# Patient Record
Sex: FEMALE | Race: OTHER | HISPANIC OR LATINO
[De-identification: names, ages, dates, MRNs, and addresses within clinical notes are randomized per-mention and may not be internally consistent; named-entity substitution may affect disease eponyms.]

---

## 2017-11-02 ENCOUNTER — RECORD ABSTRACTING (OUTPATIENT)
Age: 42
End: 2017-11-02

## 2017-11-02 DIAGNOSIS — Z86.018 PERSONAL HISTORY OF OTHER BENIGN NEOPLASM: ICD-10-CM

## 2017-11-02 DIAGNOSIS — Z92.89 PERSONAL HISTORY OF OTHER MEDICAL TREATMENT: ICD-10-CM

## 2017-11-02 DIAGNOSIS — Z78.9 OTHER SPECIFIED HEALTH STATUS: ICD-10-CM

## 2017-11-02 DIAGNOSIS — Z87.42 PERSONAL HISTORY OF OTHER DISEASES OF THE FEMALE GENITAL TRACT: ICD-10-CM

## 2017-11-02 DIAGNOSIS — Z87.898 PERSONAL HISTORY OF OTHER SPECIFIED CONDITIONS: ICD-10-CM

## 2017-11-06 ENCOUNTER — OUTPATIENT (OUTPATIENT)
Dept: OUTPATIENT SERVICES | Facility: HOSPITAL | Age: 42
LOS: 1 days | End: 2017-11-06
Payer: COMMERCIAL

## 2017-11-06 VITALS
DIASTOLIC BLOOD PRESSURE: 87 MMHG | SYSTOLIC BLOOD PRESSURE: 127 MMHG | RESPIRATION RATE: 16 BRPM | TEMPERATURE: 98 F | HEART RATE: 59 BPM | HEIGHT: 67 IN | WEIGHT: 233.69 LBS

## 2017-11-06 DIAGNOSIS — Z01.818 ENCOUNTER FOR OTHER PREPROCEDURAL EXAMINATION: ICD-10-CM

## 2017-11-06 DIAGNOSIS — Z98.890 OTHER SPECIFIED POSTPROCEDURAL STATES: Chronic | ICD-10-CM

## 2017-11-06 DIAGNOSIS — N92.0 EXCESSIVE AND FREQUENT MENSTRUATION WITH REGULAR CYCLE: ICD-10-CM

## 2017-11-06 DIAGNOSIS — N80.9 ENDOMETRIOSIS, UNSPECIFIED: Chronic | ICD-10-CM

## 2017-11-06 DIAGNOSIS — N80.9 ENDOMETRIOSIS, UNSPECIFIED: ICD-10-CM

## 2017-11-06 LAB
ANION GAP SERPL CALC-SCNC: 12 MMOL/L — SIGNIFICANT CHANGE UP (ref 5–17)
APTT BLD: 33.3 SEC — SIGNIFICANT CHANGE UP (ref 27.5–37.4)
BLD GP AB SCN SERPL QL: SIGNIFICANT CHANGE UP
BUN SERPL-MCNC: 7 MG/DL — LOW (ref 8–20)
CALCIUM SERPL-MCNC: 8.5 MG/DL — LOW (ref 8.6–10.2)
CHLORIDE SERPL-SCNC: 102 MMOL/L — SIGNIFICANT CHANGE UP (ref 98–107)
CO2 SERPL-SCNC: 26 MMOL/L — SIGNIFICANT CHANGE UP (ref 22–29)
CREAT SERPL-MCNC: 0.56 MG/DL — SIGNIFICANT CHANGE UP (ref 0.5–1.3)
GLUCOSE SERPL-MCNC: 95 MG/DL — SIGNIFICANT CHANGE UP (ref 70–115)
HCG UR QL: NEGATIVE — SIGNIFICANT CHANGE UP
HCT VFR BLD CALC: 42 % — SIGNIFICANT CHANGE UP (ref 37–47)
HGB BLD-MCNC: 13.9 G/DL — SIGNIFICANT CHANGE UP (ref 12–16)
INR BLD: 0.91 RATIO — SIGNIFICANT CHANGE UP (ref 0.88–1.16)
MCHC RBC-ENTMCNC: 28.8 PG — SIGNIFICANT CHANGE UP (ref 27–31)
MCHC RBC-ENTMCNC: 33.1 G/DL — SIGNIFICANT CHANGE UP (ref 32–36)
MCV RBC AUTO: 87 FL — SIGNIFICANT CHANGE UP (ref 81–99)
PLATELET # BLD AUTO: 284 K/UL — SIGNIFICANT CHANGE UP (ref 150–400)
POTASSIUM SERPL-MCNC: 4.1 MMOL/L — SIGNIFICANT CHANGE UP (ref 3.5–5.3)
POTASSIUM SERPL-SCNC: 4.1 MMOL/L — SIGNIFICANT CHANGE UP (ref 3.5–5.3)
PROTHROM AB SERPL-ACNC: 10 SEC — SIGNIFICANT CHANGE UP (ref 9.8–12.7)
RBC # BLD: 4.83 M/UL — SIGNIFICANT CHANGE UP (ref 4.4–5.2)
RBC # FLD: 12.9 % — SIGNIFICANT CHANGE UP (ref 11–15.6)
SODIUM SERPL-SCNC: 140 MMOL/L — SIGNIFICANT CHANGE UP (ref 135–145)
TYPE + AB SCN PNL BLD: SIGNIFICANT CHANGE UP
WBC # BLD: 6.2 K/UL — SIGNIFICANT CHANGE UP (ref 4.8–10.8)
WBC # FLD AUTO: 6.2 K/UL — SIGNIFICANT CHANGE UP (ref 4.8–10.8)

## 2017-11-06 PROCEDURE — G0463: CPT

## 2017-11-06 PROCEDURE — 85610 PROTHROMBIN TIME: CPT

## 2017-11-06 PROCEDURE — 86900 BLOOD TYPING SEROLOGIC ABO: CPT

## 2017-11-06 PROCEDURE — 85730 THROMBOPLASTIN TIME PARTIAL: CPT

## 2017-11-06 PROCEDURE — 86901 BLOOD TYPING SEROLOGIC RH(D): CPT

## 2017-11-06 PROCEDURE — 85027 COMPLETE CBC AUTOMATED: CPT

## 2017-11-06 PROCEDURE — 86850 RBC ANTIBODY SCREEN: CPT

## 2017-11-06 PROCEDURE — 80048 BASIC METABOLIC PNL TOTAL CA: CPT

## 2017-11-06 PROCEDURE — 81025 URINE PREGNANCY TEST: CPT

## 2017-11-06 PROCEDURE — 36415 COLL VENOUS BLD VENIPUNCTURE: CPT

## 2017-11-06 NOTE — H&P PST ADULT - FAMILY HISTORY
Mother  Still living? Yes, Estimated age: 51-60  Family history of breast cancer, Age at diagnosis: 41-50

## 2017-11-06 NOTE — H&P PST ADULT - NSANTHOSAYNRD_GEN_A_CORE
No. CHRISTINE screening performed.  STOP BANG Legend: 0-2 = LOW Risk; 3-4 = INTERMEDIATE Risk; 5-8 = HIGH Risk/Tested negative four years ago

## 2017-11-14 ENCOUNTER — RESULT REVIEW (OUTPATIENT)
Age: 42
End: 2017-11-14

## 2017-11-14 ENCOUNTER — APPOINTMENT (OUTPATIENT)
Dept: OBGYN | Facility: HOSPITAL | Age: 42
End: 2017-11-14

## 2017-11-14 ENCOUNTER — OUTPATIENT (OUTPATIENT)
Dept: OUTPATIENT SERVICES | Facility: HOSPITAL | Age: 42
LOS: 1 days | End: 2017-11-14
Payer: COMMERCIAL

## 2017-11-14 ENCOUNTER — TRANSCRIPTION ENCOUNTER (OUTPATIENT)
Age: 42
End: 2017-11-14

## 2017-11-14 VITALS
WEIGHT: 233.03 LBS | TEMPERATURE: 98 F | HEART RATE: 77 BPM | SYSTOLIC BLOOD PRESSURE: 110 MMHG | HEIGHT: 67 IN | DIASTOLIC BLOOD PRESSURE: 50 MMHG | OXYGEN SATURATION: 99 % | RESPIRATION RATE: 16 BRPM

## 2017-11-14 VITALS
HEART RATE: 66 BPM | DIASTOLIC BLOOD PRESSURE: 71 MMHG | TEMPERATURE: 98 F | SYSTOLIC BLOOD PRESSURE: 111 MMHG | RESPIRATION RATE: 16 BRPM | OXYGEN SATURATION: 97 %

## 2017-11-14 DIAGNOSIS — R10.2 PELVIC AND PERINEAL PAIN: ICD-10-CM

## 2017-11-14 DIAGNOSIS — Z01.818 ENCOUNTER FOR OTHER PREPROCEDURAL EXAMINATION: ICD-10-CM

## 2017-11-14 DIAGNOSIS — N80.9 ENDOMETRIOSIS, UNSPECIFIED: Chronic | ICD-10-CM

## 2017-11-14 DIAGNOSIS — N92.0 EXCESSIVE AND FREQUENT MENSTRUATION WITH REGULAR CYCLE: ICD-10-CM

## 2017-11-14 DIAGNOSIS — Z98.890 OTHER SPECIFIED POSTPROCEDURAL STATES: Chronic | ICD-10-CM

## 2017-11-14 LAB — ABO RH CONFIRMATION: SIGNIFICANT CHANGE UP

## 2017-11-14 PROCEDURE — 88305 TISSUE EXAM BY PATHOLOGIST: CPT | Mod: 26

## 2017-11-14 PROCEDURE — 86850 RBC ANTIBODY SCREEN: CPT

## 2017-11-14 PROCEDURE — 88305 TISSUE EXAM BY PATHOLOGIST: CPT

## 2017-11-14 PROCEDURE — 58558 HYSTEROSCOPY BIOPSY: CPT

## 2017-11-14 PROCEDURE — 36415 COLL VENOUS BLD VENIPUNCTURE: CPT

## 2017-11-14 PROCEDURE — 86900 BLOOD TYPING SEROLOGIC ABO: CPT

## 2017-11-14 RX ORDER — SODIUM CHLORIDE 9 MG/ML
1000 INJECTION, SOLUTION INTRAVENOUS
Qty: 0 | Refills: 0 | Status: DISCONTINUED | OUTPATIENT
Start: 2017-11-14 | End: 2017-11-14

## 2017-11-14 RX ORDER — SODIUM CHLORIDE 9 MG/ML
3 INJECTION INTRAMUSCULAR; INTRAVENOUS; SUBCUTANEOUS ONCE
Qty: 0 | Refills: 0 | Status: DISCONTINUED | OUTPATIENT
Start: 2017-11-14 | End: 2017-11-14

## 2017-11-14 RX ORDER — ONDANSETRON 8 MG/1
4 TABLET, FILM COATED ORAL ONCE
Qty: 0 | Refills: 0 | Status: DISCONTINUED | OUTPATIENT
Start: 2017-11-14 | End: 2017-11-14

## 2017-11-14 RX ORDER — FENTANYL CITRATE 50 UG/ML
25 INJECTION INTRAVENOUS
Qty: 0 | Refills: 0 | Status: DISCONTINUED | OUTPATIENT
Start: 2017-11-14 | End: 2017-11-14

## 2017-11-14 RX ORDER — OXYCODONE AND ACETAMINOPHEN 5; 325 MG/1; MG/1
1 TABLET ORAL EVERY 4 HOURS
Qty: 0 | Refills: 0 | Status: DISCONTINUED | OUTPATIENT
Start: 2017-11-14 | End: 2017-11-14

## 2017-11-14 RX ADMIN — SODIUM CHLORIDE 125 MILLILITER(S): 9 INJECTION, SOLUTION INTRAVENOUS at 19:20

## 2017-11-14 RX ADMIN — FENTANYL CITRATE 25 MICROGRAM(S): 50 INJECTION INTRAVENOUS at 20:10

## 2017-11-14 RX ADMIN — FENTANYL CITRATE 25 MICROGRAM(S): 50 INJECTION INTRAVENOUS at 19:40

## 2017-11-14 RX ADMIN — FENTANYL CITRATE 25 MICROGRAM(S): 50 INJECTION INTRAVENOUS at 19:19

## 2017-11-14 RX ADMIN — OXYCODONE AND ACETAMINOPHEN 1 TABLET(S): 5; 325 TABLET ORAL at 21:00

## 2017-11-14 RX ADMIN — FENTANYL CITRATE 25 MICROGRAM(S): 50 INJECTION INTRAVENOUS at 20:09

## 2017-11-14 RX ADMIN — OXYCODONE AND ACETAMINOPHEN 1 TABLET(S): 5; 325 TABLET ORAL at 20:29

## 2017-11-14 NOTE — ASU DISCHARGE PLAN (ADULT/PEDIATRIC). - MEDICATION SUMMARY - MEDICATIONS TO TAKE
I will START or STAY ON the medications listed below when I get home from the hospital:    Valerian oral capsule  -- Indication: For Menorrhagia with regular cycle

## 2017-11-14 NOTE — BRIEF OPERATIVE NOTE - PROCEDURE
<<-----Click on this checkbox to enter Procedure Hysteroscopy with dilation and curettage of uterus  11/14/2017    Active  SHERRILL

## 2017-11-14 NOTE — ASU DISCHARGE PLAN (ADULT/PEDIATRIC). - ACTIVITY LEVEL
no weight bearing/no heavy lifting/quiet play/no tub baths/no douching/no intercourse/no exercise/no sports/gym/weight bearing as tolerated/nothing per vagina/elevate extremity/nothing per rectum/no tampons

## 2017-11-14 NOTE — ASU DISCHARGE PLAN (ADULT/PEDIATRIC). - NOTIFY
Bleeding that does not stop Inability to Tolerate Liquids or Foods/GYN Fever>100.4/Persistent Nausea and Vomiting/Bleeding that does not stop

## 2017-11-16 LAB — SURGICAL PATHOLOGY FINAL REPORT - CH: SIGNIFICANT CHANGE UP

## 2017-11-23 ENCOUNTER — APPOINTMENT (OUTPATIENT)
Dept: OBGYN | Facility: CLINIC | Age: 42
End: 2017-11-23

## 2017-11-29 ENCOUNTER — APPOINTMENT (OUTPATIENT)
Dept: OBGYN | Facility: CLINIC | Age: 42
End: 2017-11-29
Payer: COMMERCIAL

## 2017-11-29 PROCEDURE — 99213 OFFICE O/P EST LOW 20 MIN: CPT

## 2018-01-10 ENCOUNTER — TRANSCRIPTION ENCOUNTER (OUTPATIENT)
Age: 43
End: 2018-01-10

## 2018-06-01 ENCOUNTER — OTHER (OUTPATIENT)
Age: 43
End: 2018-06-01

## 2018-06-01 ENCOUNTER — RX RENEWAL (OUTPATIENT)
Age: 43
End: 2018-06-01

## 2018-06-25 ENCOUNTER — APPOINTMENT (OUTPATIENT)
Dept: OBGYN | Facility: CLINIC | Age: 43
End: 2018-06-25
Payer: COMMERCIAL

## 2018-06-25 DIAGNOSIS — Z80.9 FAMILY HISTORY OF MALIGNANT NEOPLASM, UNSPECIFIED: ICD-10-CM

## 2018-06-25 DIAGNOSIS — Z00.00 ENCOUNTER FOR GENERAL ADULT MEDICAL EXAMINATION W/OUT ABNORMAL FINDINGS: ICD-10-CM

## 2018-06-25 PROCEDURE — 99214 OFFICE O/P EST MOD 30 MIN: CPT

## 2018-06-27 LAB
C TRACH RRNA SPEC QL NAA+PROBE: NOT DETECTED
HPV HIGH+LOW RISK DNA PNL CVX: NOT DETECTED
N GONORRHOEA RRNA SPEC QL NAA+PROBE: NOT DETECTED
SOURCE TP AMPLIFICATION: NORMAL

## 2018-06-28 LAB — CYTOLOGY CVX/VAG DOC THIN PREP: NORMAL

## 2018-07-11 ENCOUNTER — APPOINTMENT (OUTPATIENT)
Dept: OBGYN | Facility: CLINIC | Age: 43
End: 2018-07-11
Payer: COMMERCIAL

## 2018-07-11 VITALS
HEIGHT: 67 IN | DIASTOLIC BLOOD PRESSURE: 69 MMHG | SYSTOLIC BLOOD PRESSURE: 131 MMHG | BODY MASS INDEX: 37.67 KG/M2 | HEART RATE: 74 BPM | WEIGHT: 240 LBS

## 2018-07-11 PROCEDURE — 99213 OFFICE O/P EST LOW 20 MIN: CPT

## 2018-07-16 PROBLEM — N80.9 ENDOMETRIOSIS, UNSPECIFIED: Chronic | Status: ACTIVE | Noted: 2017-11-06

## 2018-07-16 PROBLEM — D64.9 ANEMIA, UNSPECIFIED: Chronic | Status: ACTIVE | Noted: 2017-11-06

## 2018-08-21 ENCOUNTER — OUTPATIENT (OUTPATIENT)
Dept: OUTPATIENT SERVICES | Facility: HOSPITAL | Age: 43
LOS: 1 days | End: 2018-08-21
Payer: COMMERCIAL

## 2018-08-21 VITALS
TEMPERATURE: 98 F | HEART RATE: 64 BPM | RESPIRATION RATE: 15 BRPM | WEIGHT: 229.28 LBS | DIASTOLIC BLOOD PRESSURE: 64 MMHG | HEIGHT: 67 IN | SYSTOLIC BLOOD PRESSURE: 100 MMHG

## 2018-08-21 DIAGNOSIS — Z98.890 OTHER SPECIFIED POSTPROCEDURAL STATES: Chronic | ICD-10-CM

## 2018-08-21 DIAGNOSIS — D25.9 LEIOMYOMA OF UTERUS, UNSPECIFIED: ICD-10-CM

## 2018-08-21 DIAGNOSIS — N80.9 ENDOMETRIOSIS, UNSPECIFIED: Chronic | ICD-10-CM

## 2018-08-21 DIAGNOSIS — Z01.818 ENCOUNTER FOR OTHER PREPROCEDURAL EXAMINATION: ICD-10-CM

## 2018-08-21 LAB
ANION GAP SERPL CALC-SCNC: 13 MMOL/L — SIGNIFICANT CHANGE UP (ref 5–17)
BUN SERPL-MCNC: 12 MG/DL — SIGNIFICANT CHANGE UP (ref 8–20)
CALCIUM SERPL-MCNC: 8.6 MG/DL — SIGNIFICANT CHANGE UP (ref 8.6–10.2)
CHLORIDE SERPL-SCNC: 103 MMOL/L — SIGNIFICANT CHANGE UP (ref 98–107)
CO2 SERPL-SCNC: 23 MMOL/L — SIGNIFICANT CHANGE UP (ref 22–29)
CREAT SERPL-MCNC: 0.61 MG/DL — SIGNIFICANT CHANGE UP (ref 0.5–1.3)
GLUCOSE SERPL-MCNC: 92 MG/DL — SIGNIFICANT CHANGE UP (ref 70–115)
HCT VFR BLD CALC: 40.3 % — SIGNIFICANT CHANGE UP (ref 37–47)
HGB BLD-MCNC: 13.5 G/DL — SIGNIFICANT CHANGE UP (ref 12–16)
MCHC RBC-ENTMCNC: 29 PG — SIGNIFICANT CHANGE UP (ref 27–31)
MCHC RBC-ENTMCNC: 33.5 G/DL — SIGNIFICANT CHANGE UP (ref 32–36)
MCV RBC AUTO: 86.7 FL — SIGNIFICANT CHANGE UP (ref 81–99)
PLATELET # BLD AUTO: 293 K/UL — SIGNIFICANT CHANGE UP (ref 150–400)
POTASSIUM SERPL-MCNC: 3.8 MMOL/L — SIGNIFICANT CHANGE UP (ref 3.5–5.3)
POTASSIUM SERPL-SCNC: 3.8 MMOL/L — SIGNIFICANT CHANGE UP (ref 3.5–5.3)
RBC # BLD: 4.65 M/UL — SIGNIFICANT CHANGE UP (ref 4.4–5.2)
RBC # FLD: 13.7 % — SIGNIFICANT CHANGE UP (ref 11–15.6)
SODIUM SERPL-SCNC: 139 MMOL/L — SIGNIFICANT CHANGE UP (ref 135–145)
TYPE + AB SCN PNL BLD: SIGNIFICANT CHANGE UP
WBC # BLD: 6.6 K/UL — SIGNIFICANT CHANGE UP (ref 4.8–10.8)
WBC # FLD AUTO: 6.6 K/UL — SIGNIFICANT CHANGE UP (ref 4.8–10.8)

## 2018-08-21 PROCEDURE — 86850 RBC ANTIBODY SCREEN: CPT

## 2018-08-21 PROCEDURE — 80048 BASIC METABOLIC PNL TOTAL CA: CPT

## 2018-08-21 PROCEDURE — G0463: CPT

## 2018-08-21 PROCEDURE — 86901 BLOOD TYPING SEROLOGIC RH(D): CPT

## 2018-08-21 PROCEDURE — 86900 BLOOD TYPING SEROLOGIC ABO: CPT

## 2018-08-21 PROCEDURE — 36415 COLL VENOUS BLD VENIPUNCTURE: CPT

## 2018-08-21 PROCEDURE — 85027 COMPLETE CBC AUTOMATED: CPT

## 2018-08-21 RX ORDER — SODIUM CHLORIDE 9 MG/ML
3 INJECTION INTRAMUSCULAR; INTRAVENOUS; SUBCUTANEOUS EVERY 8 HOURS
Qty: 0 | Refills: 0 | Status: DISCONTINUED | OUTPATIENT
Start: 2018-08-28 | End: 2018-08-30

## 2018-08-21 NOTE — H&P PST ADULT - ASSESSMENT
43 year old female presents with uterine fibroids and menorrhagia scheduled for multiple myomectomies and an exploratory laparotomy on 18.  CAPRINI SCORE [CLOT]    AGE RELATED RISK FACTORS                                                       MOBILITY RELATED FACTORS  [ ] Age 41-60 years                                            (1 Point)                  [ ] Bed rest                                                        (1 Point)  [ ] Age: 61-74 years                                           (2 Points)                 [ ] Plaster cast                                                   (2 Points)  [ ] Age= 75 years                                              (3 Points)                 [ ] Bed bound for more than 72 hours                 (2 Points)    DISEASE RELATED RISK FACTORS                                               GENDER SPECIFIC FACTORS  [ ] Edema in the lower extremities                       (1 Point)                  [ ] Pregnancy                                                     (1 Point)  [ ] Varicose veins                                               (1 Point)                  [ ] Post-partum < 6 weeks                                   (1 Point)             [x ] BMI > 25 Kg/m2                                            (1 Point)                  [x ] Hormonal therapy  or oral contraception          (1 Point)                 [ ] Sepsis (in the previous month)                        (1 Point)                  [ ] History of pregnancy complications                 (1 point)  [ ] Pneumonia or serious lung disease                                               [ ] Unexplained or recurrent                     (1 Point)           (in the previous month)                               (1 Point)  [ ] Abnormal pulmonary function test                     (1 Point)                 SURGERY RELATED RISK FACTORS  [ ] Acute myocardial infarction                              (1 Point)                 [ ]  Section                                             (1 Point)  [ ] Congestive heart failure (in the previous month)  (1 Point)               [ ] Minor surgery                                                  (1 Point)   [ ] Inflammatory bowel disease                             (1 Point)                 [ ] Arthroscopic surgery                                        (2 Points)  [ ] Central venous access                                      (2 Points)                [x ] General surgery lasting more than 45 minutes   (2 Points)       [ ] Stroke (in the previous month)                          (5 Points)               [ ] Elective arthroplasty                                         (5 Points)                                                                                                                                               HEMATOLOGY RELATED FACTORS                                                 TRAUMA RELATED RISK FACTORS  [ ] Prior episodes of VTE                                     (3 Points)                 [ ] Fracture of the hip, pelvis, or leg                       (5 Points)  [ ] Positive family history for VTE                         (3 Points)                 [ ] Acute spinal cord injury (in the previous month)  (5 Points)  [ ] Prothrombin 94585 A                                     (3 Points)                 [ ] Paralysis  (less than 1 month)                             (5 Points)  [ ] Factor V Leiden                                             (3 Points)                  [ ] Multiple Trauma within 1 month                        (5 Points)  [ ] Lupus anticoagulants                                     (3 Points)                                                           [ ] Anticardiolipin antibodies                               (3 Points)                                                       [ ] High homocysteine in the blood                      (3 Points)                                             [ ] Other congenital or acquired thrombophilia      (3 Points)                                                [ ] Heparin induced thrombocytopenia                  (3 Points)                                          Total Score [   5       ]

## 2018-08-21 NOTE — H&P PST ADULT - HISTORY OF PRESENT ILLNESS
43 year old female with a history of PCOS and endometriosis presents with uterine fibroids scheduled for a myomectomy and an exploratory laparotomy on 8/28/18.

## 2018-08-27 ENCOUNTER — TRANSCRIPTION ENCOUNTER (OUTPATIENT)
Age: 43
End: 2018-08-27

## 2018-08-28 ENCOUNTER — TRANSCRIPTION ENCOUNTER (OUTPATIENT)
Age: 43
End: 2018-08-28

## 2018-08-28 ENCOUNTER — APPOINTMENT (OUTPATIENT)
Dept: OBGYN | Facility: HOSPITAL | Age: 43
End: 2018-08-28

## 2018-08-28 ENCOUNTER — RESULT REVIEW (OUTPATIENT)
Age: 43
End: 2018-08-28

## 2018-08-28 ENCOUNTER — INPATIENT (INPATIENT)
Facility: HOSPITAL | Age: 43
LOS: 1 days | Discharge: HOME | DRG: 743 | End: 2018-08-30
Attending: OBSTETRICS & GYNECOLOGY | Admitting: OBSTETRICS & GYNECOLOGY
Payer: COMMERCIAL

## 2018-08-28 VITALS
WEIGHT: 229.94 LBS | HEIGHT: 67 IN | OXYGEN SATURATION: 100 % | RESPIRATION RATE: 16 BRPM | DIASTOLIC BLOOD PRESSURE: 65 MMHG | TEMPERATURE: 98 F | SYSTOLIC BLOOD PRESSURE: 142 MMHG | HEART RATE: 65 BPM

## 2018-08-28 DIAGNOSIS — Z98.890 OTHER SPECIFIED POSTPROCEDURAL STATES: Chronic | ICD-10-CM

## 2018-08-28 DIAGNOSIS — D25.1 INTRAMURAL LEIOMYOMA OF UTERUS: ICD-10-CM

## 2018-08-28 DIAGNOSIS — N80.9 ENDOMETRIOSIS, UNSPECIFIED: Chronic | ICD-10-CM

## 2018-08-28 LAB
GLUCOSE BLDC GLUCOMTR-MCNC: 109 MG/DL — HIGH (ref 70–99)
GLUCOSE BLDC GLUCOMTR-MCNC: 95 MG/DL — SIGNIFICANT CHANGE UP (ref 70–99)

## 2018-08-28 PROCEDURE — 58140 MYOMECTOMY ABDOM METHOD: CPT

## 2018-08-28 PROCEDURE — 88307 TISSUE EXAM BY PATHOLOGIST: CPT | Mod: 26

## 2018-08-28 RX ORDER — ONDANSETRON 8 MG/1
4 TABLET, FILM COATED ORAL ONCE
Qty: 0 | Refills: 0 | Status: DISCONTINUED | OUTPATIENT
Start: 2018-08-28 | End: 2018-08-28

## 2018-08-28 RX ORDER — ONDANSETRON 8 MG/1
4 TABLET, FILM COATED ORAL EVERY 6 HOURS
Qty: 0 | Refills: 0 | Status: DISCONTINUED | OUTPATIENT
Start: 2018-08-28 | End: 2018-08-30

## 2018-08-28 RX ORDER — OXYCODONE AND ACETAMINOPHEN 5; 325 MG/1; MG/1
1 TABLET ORAL EVERY 4 HOURS
Qty: 0 | Refills: 0 | Status: DISCONTINUED | OUTPATIENT
Start: 2018-08-28 | End: 2018-08-30

## 2018-08-28 RX ORDER — HYDROMORPHONE HYDROCHLORIDE 2 MG/ML
30 INJECTION INTRAMUSCULAR; INTRAVENOUS; SUBCUTANEOUS
Qty: 0 | Refills: 0 | Status: DISCONTINUED | OUTPATIENT
Start: 2018-08-28 | End: 2018-08-28

## 2018-08-28 RX ORDER — OXYCODONE AND ACETAMINOPHEN 5; 325 MG/1; MG/1
2 TABLET ORAL EVERY 6 HOURS
Qty: 0 | Refills: 0 | Status: DISCONTINUED | OUTPATIENT
Start: 2018-08-28 | End: 2018-08-29

## 2018-08-28 RX ORDER — CEFOTETAN DISODIUM 1 G
2 VIAL (EA) INJECTION ONCE
Qty: 0 | Refills: 0 | Status: COMPLETED | OUTPATIENT
Start: 2018-08-28 | End: 2018-08-28

## 2018-08-28 RX ORDER — DOCUSATE SODIUM 100 MG
100 CAPSULE ORAL THREE TIMES A DAY
Qty: 0 | Refills: 0 | Status: DISCONTINUED | OUTPATIENT
Start: 2018-08-28 | End: 2018-08-30

## 2018-08-28 RX ORDER — SODIUM CHLORIDE 9 MG/ML
1000 INJECTION, SOLUTION INTRAVENOUS
Qty: 0 | Refills: 0 | Status: DISCONTINUED | OUTPATIENT
Start: 2018-08-28 | End: 2018-08-30

## 2018-08-28 RX ORDER — ONDANSETRON 8 MG/1
4 TABLET, FILM COATED ORAL ONCE
Qty: 0 | Refills: 0 | Status: DISCONTINUED | OUTPATIENT
Start: 2018-08-28 | End: 2018-08-30

## 2018-08-28 RX ORDER — NALOXONE HYDROCHLORIDE 4 MG/.1ML
0.1 SPRAY NASAL
Qty: 0 | Refills: 0 | Status: DISCONTINUED | OUTPATIENT
Start: 2018-08-28 | End: 2018-08-30

## 2018-08-28 RX ORDER — IBUPROFEN 200 MG
400 TABLET ORAL EVERY 6 HOURS
Qty: 0 | Refills: 0 | Status: DISCONTINUED | OUTPATIENT
Start: 2018-08-28 | End: 2018-08-30

## 2018-08-28 RX ORDER — METFORMIN HYDROCHLORIDE 850 MG/1
500 TABLET ORAL
Qty: 0 | Refills: 0 | Status: DISCONTINUED | OUTPATIENT
Start: 2018-08-28 | End: 2018-08-30

## 2018-08-28 RX ORDER — DIPHENHYDRAMINE HCL 50 MG
12.5 CAPSULE ORAL EVERY 4 HOURS
Qty: 0 | Refills: 0 | Status: DISCONTINUED | OUTPATIENT
Start: 2018-08-28 | End: 2018-08-30

## 2018-08-28 RX ORDER — METFORMIN HYDROCHLORIDE 850 MG/1
1 TABLET ORAL
Qty: 0 | Refills: 0 | COMMUNITY

## 2018-08-28 RX ORDER — SODIUM CHLORIDE 9 MG/ML
1000 INJECTION, SOLUTION INTRAVENOUS
Qty: 0 | Refills: 0 | Status: DISCONTINUED | OUTPATIENT
Start: 2018-08-28 | End: 2018-08-28

## 2018-08-28 RX ORDER — HYDROMORPHONE HYDROCHLORIDE 2 MG/ML
0.5 INJECTION INTRAMUSCULAR; INTRAVENOUS; SUBCUTANEOUS
Qty: 0 | Refills: 0 | Status: DISCONTINUED | OUTPATIENT
Start: 2018-08-28 | End: 2018-08-28

## 2018-08-28 RX ADMIN — OXYCODONE AND ACETAMINOPHEN 1 TABLET(S): 5; 325 TABLET ORAL at 20:26

## 2018-08-28 RX ADMIN — HYDROMORPHONE HYDROCHLORIDE 30 MILLILITER(S): 2 INJECTION INTRAMUSCULAR; INTRAVENOUS; SUBCUTANEOUS at 13:06

## 2018-08-28 RX ADMIN — HYDROMORPHONE HYDROCHLORIDE 30 MILLILITER(S): 2 INJECTION INTRAMUSCULAR; INTRAVENOUS; SUBCUTANEOUS at 15:28

## 2018-08-28 RX ADMIN — Medication 100 GRAM(S): at 11:50

## 2018-08-28 RX ADMIN — OXYCODONE AND ACETAMINOPHEN 2 TABLET(S): 5; 325 TABLET ORAL at 21:00

## 2018-08-28 RX ADMIN — SODIUM CHLORIDE 3 MILLILITER(S): 9 INJECTION INTRAMUSCULAR; INTRAVENOUS; SUBCUTANEOUS at 13:41

## 2018-08-28 RX ADMIN — OXYCODONE AND ACETAMINOPHEN 1 TABLET(S): 5; 325 TABLET ORAL at 19:38

## 2018-08-28 RX ADMIN — SODIUM CHLORIDE 3 MILLILITER(S): 9 INJECTION INTRAMUSCULAR; INTRAVENOUS; SUBCUTANEOUS at 23:43

## 2018-08-28 RX ADMIN — ONDANSETRON 4 MILLIGRAM(S): 8 TABLET, FILM COATED ORAL at 15:54

## 2018-08-28 RX ADMIN — OXYCODONE AND ACETAMINOPHEN 1 TABLET(S): 5; 325 TABLET ORAL at 19:41

## 2018-08-28 NOTE — DISCHARGE NOTE ADULT - PLAN OF CARE
rapid recovery Patient should transition to regular activity level. Resume regular diet. Patient should follow up with her provider for a checkup 1 week after discharge from the hospital. Patient should call her doctor sooner if she develops a fever or uncontrolled vaginal bleeding. Nothing per vagina for 6 weeks (incl. sex, douching, etc). If you have additional concerns, please inform your provider.

## 2018-08-28 NOTE — DISCHARGE NOTE ADULT - MEDICATION SUMMARY - MEDICATIONS TO TAKE
I will START or STAY ON the medications listed below when I get home from the hospital:    Percocet 5/325 oral tablet  -- 1 tab(s) by mouth every 6 hours x 5 days, As Needed -Moderate Pain (4 - 6) MDD:4 tabs  -- Indication: For Severe Pain    ibuprofen 400 mg oral tablet  -- 1 tab(s) by mouth every 6 hours, As needed, Moderate pain  -- Indication: For Mild Pain    metFORMIN 500 mg oral tablet  -- 1 tab(s) by mouth 2 times a day  -- Indication: For Home med

## 2018-08-28 NOTE — DISCHARGE NOTE ADULT - CARE PROVIDERS DIRECT ADDRESSES
[email or web address]

## 2018-08-28 NOTE — DISCHARGE NOTE ADULT - MEDICATION SUMMARY - MEDICATIONS TO STOP TAKING
I will STOP taking the medications listed below when I get home from the hospital:    Microgestin 1.5/30 oral tablet  -- 1 tab(s) by mouth 2 times a day

## 2018-08-28 NOTE — DISCHARGE NOTE ADULT - HOSPITAL COURSE
Pt was admitted from [facility] for abdominal myomectomy. After unremarkable extubation, pt was transferred to PACU and then the floor in stable condition. Hospital course unremarkable. At discharge, pt is stable, urinating, passing flatus, ambulating, and tolerating PO intake.

## 2018-08-28 NOTE — DISCHARGE NOTE ADULT - CARE PROVIDER_API CALL
Raymundo House), Obstetrics and Gynecology  [address] [place] [address]  Phone: [phone]  Fax: [phone]

## 2018-08-28 NOTE — BRIEF OPERATIVE NOTE - POST-OP DX
Adenomyosis internal  08/28/2018    Active  Raymundo House  Intramural and submucous leiomyoma of uterus  08/28/2018    Active  Raymundo House

## 2018-08-28 NOTE — DISCHARGE NOTE ADULT - CARE PLAN
Principal Discharge DX:	H/O myomectomy  Goal:	rapid recovery  Assessment and plan of treatment:	Patient should transition to regular activity level. Resume regular diet. Patient should follow up with her provider for a checkup 1 week after discharge from the hospital. Patient should call her doctor sooner if she develops a fever or uncontrolled vaginal bleeding. Nothing per vagina for 6 weeks (incl. sex, douching, etc). If you have additional concerns, please inform your provider.

## 2018-08-29 DIAGNOSIS — Z98.890 OTHER SPECIFIED POSTPROCEDURAL STATES: ICD-10-CM

## 2018-08-29 LAB
EOSINOPHIL # BLD AUTO: 0 K/UL — SIGNIFICANT CHANGE UP (ref 0–0.5)
EOSINOPHIL NFR BLD AUTO: 0 % — SIGNIFICANT CHANGE UP (ref 0–6)
HCT VFR BLD CALC: 39.5 % — SIGNIFICANT CHANGE UP (ref 37–47)
HGB BLD-MCNC: 13 G/DL — SIGNIFICANT CHANGE UP (ref 12–16)
LYMPHOCYTES # BLD AUTO: 0.5 K/UL — LOW (ref 1–4.8)
LYMPHOCYTES # BLD AUTO: 3.6 % — LOW (ref 20–55)
MCHC RBC-ENTMCNC: 28.6 PG — SIGNIFICANT CHANGE UP (ref 27–31)
MCHC RBC-ENTMCNC: 32.9 G/DL — SIGNIFICANT CHANGE UP (ref 32–36)
MCV RBC AUTO: 87 FL — SIGNIFICANT CHANGE UP (ref 81–99)
MONOCYTES # BLD AUTO: 0.9 K/UL — HIGH (ref 0–0.8)
MONOCYTES NFR BLD AUTO: 6.2 % — SIGNIFICANT CHANGE UP (ref 3–10)
NEUTROPHILS # BLD AUTO: 13.3 K/UL — HIGH (ref 1.8–8)
NEUTROPHILS NFR BLD AUTO: 89.9 % — HIGH (ref 37–73)
PLATELET # BLD AUTO: 304 K/UL — SIGNIFICANT CHANGE UP (ref 150–400)
RBC # BLD: 4.54 M/UL — SIGNIFICANT CHANGE UP (ref 4.4–5.2)
RBC # FLD: 13.9 % — SIGNIFICANT CHANGE UP (ref 11–15.6)
WBC # BLD: 14.8 K/UL — HIGH (ref 4.8–10.8)
WBC # FLD AUTO: 14.8 K/UL — HIGH (ref 4.8–10.8)

## 2018-08-29 RX ORDER — IBUPROFEN 200 MG
1 TABLET ORAL
Qty: 28 | Refills: 0 | OUTPATIENT
Start: 2018-08-29 | End: 2018-09-04

## 2018-08-29 RX ORDER — OXYCODONE AND ACETAMINOPHEN 5; 325 MG/1; MG/1
2 TABLET ORAL
Qty: 0 | Refills: 0 | Status: DISCONTINUED | OUTPATIENT
Start: 2018-08-29 | End: 2018-08-30

## 2018-08-29 RX ORDER — NORETHINDRONE AND ETHINYL ESTRADIOL 0.4-0.035
1 KIT ORAL
Qty: 0 | Refills: 0 | COMMUNITY

## 2018-08-29 RX ORDER — OXYCODONE AND ACETAMINOPHEN 5; 325 MG/1; MG/1
2 TABLET ORAL ONCE
Qty: 0 | Refills: 0 | Status: DISCONTINUED | OUTPATIENT
Start: 2018-08-29 | End: 2018-08-29

## 2018-08-29 RX ADMIN — OXYCODONE AND ACETAMINOPHEN 2 TABLET(S): 5; 325 TABLET ORAL at 21:31

## 2018-08-29 RX ADMIN — OXYCODONE AND ACETAMINOPHEN 2 TABLET(S): 5; 325 TABLET ORAL at 10:08

## 2018-08-29 RX ADMIN — OXYCODONE AND ACETAMINOPHEN 2 TABLET(S): 5; 325 TABLET ORAL at 00:41

## 2018-08-29 RX ADMIN — OXYCODONE AND ACETAMINOPHEN 2 TABLET(S): 5; 325 TABLET ORAL at 11:00

## 2018-08-29 RX ADMIN — OXYCODONE AND ACETAMINOPHEN 2 TABLET(S): 5; 325 TABLET ORAL at 04:42

## 2018-08-29 RX ADMIN — SODIUM CHLORIDE 3 MILLILITER(S): 9 INJECTION INTRAMUSCULAR; INTRAVENOUS; SUBCUTANEOUS at 05:32

## 2018-08-29 RX ADMIN — Medication 400 MILLIGRAM(S): at 20:09

## 2018-08-29 RX ADMIN — Medication 400 MILLIGRAM(S): at 20:40

## 2018-08-29 RX ADMIN — METFORMIN HYDROCHLORIDE 500 MILLIGRAM(S): 850 TABLET ORAL at 21:00

## 2018-08-29 RX ADMIN — Medication 400 MILLIGRAM(S): at 08:34

## 2018-08-29 RX ADMIN — OXYCODONE AND ACETAMINOPHEN 2 TABLET(S): 5; 325 TABLET ORAL at 21:01

## 2018-08-29 RX ADMIN — Medication 100 MILLIGRAM(S): at 04:44

## 2018-08-29 RX ADMIN — METFORMIN HYDROCHLORIDE 500 MILLIGRAM(S): 850 TABLET ORAL at 10:09

## 2018-08-29 RX ADMIN — Medication 400 MILLIGRAM(S): at 09:30

## 2018-08-29 RX ADMIN — OXYCODONE AND ACETAMINOPHEN 2 TABLET(S): 5; 325 TABLET ORAL at 00:12

## 2018-08-29 NOTE — CHART NOTE - NSCHARTNOTEFT_GEN_A_CORE
Nurse reports patient to be in severe pain.    Pt seen and examined at bedside. Pt to be in moderate distress. Pt reporting she is feeling spasms in her uterus and some bleeding. Pt reports that she has not had to change it once since coming from PACU around 10pm and has not changed it since. Pt desires pain control.    Vitals taken at bedside: 128/78,  60bpm  Abdomen: ND, marked tenderness in LLQ. Bandage c/d/i. No evidence of bleeding from incision site.  Pelvic: Some blood on vagina and two upper thighs but no evidence of active bleeding. Dried blood and clots noted on exam.    A/P - d/w Dr. House  - 2 tabs of percocet STAT  - change percocet to 2 tabs q3 hrs for severe pain  - will continue to monitor patient  - Counseled patient on appropriate expectations and when to alert the nurse  - Told nurse to call back if bleeding more than 1 pad per hour

## 2018-08-29 NOTE — PROGRESS NOTE ADULT - ASSESSMENT
43y s/p abdominal myomectomy d/t fibroids and adenomyosis, POD #1, HD #1. Post op labs pending. No other complaints at this time.  Pt refused PCA pump yesterday, citing it makes her nauseous and "loopy". She is tolerating pain at the moment but is requesting something stronger for breakthrough pain.

## 2018-08-29 NOTE — PROGRESS NOTE ADULT - PROBLEM SELECTOR PLAN 1
- Continue routine post-op care.  - Encourage incentive spirometry.  - Encourage ambulation - if pt is not ambulating, use SCDs for DVT ppx.  - Advance diet as tolerated.

## 2018-08-29 NOTE — PROGRESS NOTE ADULT - SUBJECTIVE AND OBJECTIVE BOX
Name: BUCKY GRIJALVA  MRN: [id number]  Date Admitted: 08-28-18  Location: [facility] 2EST 2016 01 ([facility] 2EST)  Attending: Raymundo House  All:   Progress Note    BUCKY GRIJALVA is a 43y s/p abdominal myomectomy d/t fibroids and adenomyosis, POD #1, HD #1.    SUBJECTIVE:    Patient was seen and examined at bedside. No acute events overnight. Pt reports pain is moderately controlled with PRN pain meds. Tolerates PO intake without N/V. Higgins removed at 0530. No flatus or BM but has had burping. No problems with ambulation. Some VB, changed 2 pads since 0000.     OBJECTIVE:   T(C): 37 (08-29-18 @ 04:45), Max: 37 (08-29-18 @ 04:45)  T(F): 98.6 (08-29-18 @ 04:45), Max: 98.6 (08-29-18 @ 04:45)  HR: 50 (08-29-18 @ 04:45) (50 - 75)  BP: 106/63 (08-29-18 @ 04:45) (106/63 - 142/65)  RR: 16 (08-29-18 @ 04:45) (16 - 24)  SpO2: 98% (08-29-18 @ 04:45) (95% - 100%)    Physical Exam:  Gen: NAD, AOx3  Lungs: Speaking in full sentences without SOB.  Abd: Soft, appropriately tender, non-distended, no guarding or rebound tenderness. Wound dressing unsaturated and removed revealing an clean incision which is dry and intact with staples, without erythema or discharge.  Ext: No calf tenderness bilaterally,   : No active vaginal bleeding.    LABS:  Pending    08-28-18 @ 07:01  -  08-29-18 @ 06:54  --------------------------------------------------------  IN: 0 mL / OUT: 1750 mL / NET: -1750 mL        diphenhydrAMINE   Injectable 12.5 milliGRAM(s) IV Push every 4 hours PRN  docusate sodium 100 milliGRAM(s) Oral three times a day  ibuprofen  Tablet 400 milliGRAM(s) Oral every 6 hours PRN  lactated ringers. 1000 milliLiter(s) IV Continuous <Continuous>  metFORMIN 500 milliGRAM(s) Oral two times a day  naloxone Injectable 0.1 milliGRAM(s) IV Push every 3 minutes PRN  ondansetron Injectable 4 milliGRAM(s) IV Push every 6 hours PRN  ondansetron Injectable 4 milliGRAM(s) IV Push once PRN  oxyCODONE    5 mG/acetaminophen 325 mG 1 Tablet(s) Oral every 4 hours PRN  oxyCODONE    5 mG/acetaminophen 325 mG 2 Tablet(s) Oral every 3 hours PRN  sodium chloride 0.9% lock flush 3 milliLiter(s) IV Push every 8 hours

## 2018-08-30 ENCOUNTER — OTHER (OUTPATIENT)
Age: 43
End: 2018-08-30

## 2018-08-30 VITALS
RESPIRATION RATE: 18 BRPM | SYSTOLIC BLOOD PRESSURE: 113 MMHG | HEART RATE: 65 BPM | DIASTOLIC BLOOD PRESSURE: 73 MMHG | TEMPERATURE: 98 F

## 2018-08-30 PROCEDURE — 36415 COLL VENOUS BLD VENIPUNCTURE: CPT

## 2018-08-30 PROCEDURE — 88307 TISSUE EXAM BY PATHOLOGIST: CPT

## 2018-08-30 PROCEDURE — 82962 GLUCOSE BLOOD TEST: CPT

## 2018-08-30 PROCEDURE — 85027 COMPLETE CBC AUTOMATED: CPT

## 2018-08-30 RX ADMIN — OXYCODONE AND ACETAMINOPHEN 2 TABLET(S): 5; 325 TABLET ORAL at 09:04

## 2018-08-30 RX ADMIN — OXYCODONE AND ACETAMINOPHEN 2 TABLET(S): 5; 325 TABLET ORAL at 15:50

## 2018-08-30 RX ADMIN — OXYCODONE AND ACETAMINOPHEN 2 TABLET(S): 5; 325 TABLET ORAL at 00:09

## 2018-08-30 RX ADMIN — Medication 400 MILLIGRAM(S): at 06:08

## 2018-08-30 RX ADMIN — Medication 400 MILLIGRAM(S): at 06:31

## 2018-08-30 RX ADMIN — OXYCODONE AND ACETAMINOPHEN 2 TABLET(S): 5; 325 TABLET ORAL at 10:00

## 2018-08-30 RX ADMIN — OXYCODONE AND ACETAMINOPHEN 2 TABLET(S): 5; 325 TABLET ORAL at 00:39

## 2018-08-30 RX ADMIN — OXYCODONE AND ACETAMINOPHEN 2 TABLET(S): 5; 325 TABLET ORAL at 14:54

## 2018-08-30 RX ADMIN — METFORMIN HYDROCHLORIDE 500 MILLIGRAM(S): 850 TABLET ORAL at 09:03

## 2018-08-30 RX ADMIN — Medication 100 MILLIGRAM(S): at 06:08

## 2018-08-30 RX ADMIN — Medication 10 MILLIGRAM(S): at 09:03

## 2018-08-30 NOTE — PROGRESS NOTE ADULT - PROBLEM SELECTOR PLAN 1
- Continue routine post-op care.  - Encourage incentive spirometry.  - Encourage ambulation - if pt is not ambulating, use SCDs for DVT ppx.  - Advance diet as tolerated.  - Plan for D/C today

## 2018-08-30 NOTE — PROGRESS NOTE ADULT - ASSESSMENT
43y s/p abdominal myomectomy d/t fibroids and adenomyosis, POD #2, HD #2. Pt. is recovering well, hemodynamically stable, vital signs within normal limits, post-op labs reviewed and unremarkable. Pt. desires to go home today.

## 2018-08-30 NOTE — PROGRESS NOTE ADULT - SUBJECTIVE AND OBJECTIVE BOX
Name: BUCKY GRIJALVA  MRN: [id number]  Date Admitted: 08-28-18  Location: [facility] 2EST 2016 01 ([facility] 2EST)  Attending: Raymundo House  All:   Progress Note    BUCKY GRIJALVA is a 43y s/p abdominal myomectomy d/t fibroids and adenomyosis, POD #2, HD #2.    SUBJECTIVE:    Patient was seen and examined at bedside. No acute events overnight. Pt reports pain is well controlled with PRN pain medication. Tolerates PO intake without N/V. Pt urinating well. Has had flatus but no BM. No problems with ambulation.    OBJECTIVE:   T(C): 36.7 (08-30-18 @ 04:42), Max: 37.2 (08-29-18 @ 12:15)  T(F): 98 (08-30-18 @ 04:42), Max: 99 (08-29-18 @ 12:15)  HR: 75 (08-30-18 @ 04:42) (57 - 87)  BP: 113/69 (08-30-18 @ 04:42) (103/59 - 117/72)  BP(mean): --  RR: 18 (08-30-18 @ 04:42) (16 - 18)  SpO2: 97% (08-30-18 @ 04:42) (97% - 98%)    Physical Exam:  Gen: NAD, AOx3  Lungs: Speaking in full sentences without SOB.  Abd: Soft, appropriately tender, non-distended, no guarding or rebound tenderness. Clean incision which is dry and intact with staples, without erythema or discharge.    LABS:  Complete Blood Count + Automated Diff (08.29.18 @ 07:23)    WBC Count: 14.8 K/uL    RBC Count: 4.54 M/uL    Hemoglobin: 13.0 g/dL    Hematocrit: 39.5 %    Mean Cell Volume: 87.0 fl    Mean Cell Hemoglobin: 28.6 pg    Mean Cell Hemoglobin Conc: 32.9 g/dL    Red Cell Distrib Width: 13.9 %    Platelet Count - Automated: 304 K/uL    Auto Neutrophil #: 13.3 K/uL    Auto Lymphocyte #: 0.5 K/uL    Auto Monocyte #: 0.9 K/uL    Auto Eosinophil #: 0.0 K/uL    Auto Neutrophil %: 89.9: Differential percentages must be correlated with absolute numbers for  clinical significance. %    Auto Lymphocyte %: 3.6 %    Auto Monocyte %: 6.2 %    Auto Eosinophil %: 0.0 %    08-28-18 @ 07:01  -  08-29-18 @ 07:00  --------------------------------------------------------  IN: 0 mL / OUT: 1750 mL / NET: -1750 mL    bisacodyl Suppository 10 milliGRAM(s) Rectal daily  diphenhydrAMINE   Injectable 12.5 milliGRAM(s) IV Push every 4 hours PRN  docusate sodium 100 milliGRAM(s) Oral three times a day  ibuprofen  Tablet 400 milliGRAM(s) Oral every 6 hours PRN  lactated ringers. 1000 milliLiter(s) IV Continuous <Continuous>  metFORMIN 500 milliGRAM(s) Oral two times a day  naloxone Injectable 0.1 milliGRAM(s) IV Push every 3 minutes PRN  ondansetron Injectable 4 milliGRAM(s) IV Push every 6 hours PRN  ondansetron Injectable 4 milliGRAM(s) IV Push once PRN  oxyCODONE    5 mG/acetaminophen 325 mG 1 Tablet(s) Oral every 4 hours PRN  oxyCODONE    5 mG/acetaminophen 325 mG 2 Tablet(s) Oral every 3 hours PRN  sodium chloride 0.9% lock flush 3 milliLiter(s) IV Push every 8 hours

## 2018-08-31 PROBLEM — D25.9 LEIOMYOMA OF UTERUS, UNSPECIFIED: Chronic | Status: ACTIVE | Noted: 2018-08-21

## 2018-08-31 PROBLEM — N92.1 EXCESSIVE AND FREQUENT MENSTRUATION WITH IRREGULAR CYCLE: Chronic | Status: ACTIVE | Noted: 2018-08-21

## 2018-09-04 LAB — SURGICAL PATHOLOGY FINAL REPORT - CH: SIGNIFICANT CHANGE UP

## 2018-09-05 ENCOUNTER — APPOINTMENT (OUTPATIENT)
Dept: OBGYN | Facility: CLINIC | Age: 43
End: 2018-09-05
Payer: COMMERCIAL

## 2018-09-05 VITALS
BODY MASS INDEX: 37.67 KG/M2 | WEIGHT: 240 LBS | DIASTOLIC BLOOD PRESSURE: 79 MMHG | SYSTOLIC BLOOD PRESSURE: 124 MMHG | HEART RATE: 86 BPM | HEIGHT: 67 IN

## 2018-09-05 DIAGNOSIS — Z09 ENCOUNTER FOR FOLLOW-UP EXAMINATION AFTER COMPLETED TREATMENT FOR CONDITIONS OTHER THAN MALIGNANT NEOPLASM: ICD-10-CM

## 2018-09-05 PROCEDURE — 99024 POSTOP FOLLOW-UP VISIT: CPT

## 2018-10-10 ENCOUNTER — APPOINTMENT (OUTPATIENT)
Dept: OBGYN | Facility: CLINIC | Age: 43
End: 2018-10-10
Payer: COMMERCIAL

## 2018-10-10 DIAGNOSIS — R10.30 LOWER ABDOMINAL PAIN, UNSPECIFIED: ICD-10-CM

## 2018-10-10 PROCEDURE — 99024 POSTOP FOLLOW-UP VISIT: CPT

## 2020-07-15 NOTE — ASU PREOP CHECKLIST - HAND OFF
Constitutional: (-) fever, (-) chills  Eyes/ENT:  (-) epistaxis, (-) sore throat  Cardiovascular: (-) chest pain, (-) syncope  Respiratory: (-) cough, (-) shortness of breath  Gastrointestinal: (-) pain, (-) nausea, (-) vomiting, (-) diarrhea  Musculoskeletal: (-) neck pain, (-) back pain, (+)  R foot pain  Integumentary: (-) rash, (-) edema  Neurological: (-) headache, (-) altered mental status  Allergic/Immunologic: (-) pruritus yes

## 2020-12-04 NOTE — H&P PST ADULT - NSANTHOBSERVEDRD_ENT_A_CORE
Call to patient. Patient denies any complaints related to anticoagulation therapy at this time. Patient reports no change in diet or health. Reinforced with patient to call clinic with any medication changes as this can impact INR. Reinforced signs and symptoms bleeding/clotting with patient. Patient aware to seek medical care if signs and symptoms develop. Advised that if patient falls and/or hits their head, they should seek medical attention. Verbalizes understanding.     Dosing instructions given to patient verbally over the phone. Advised to call the clinic with any questions or concerns. Patient verbalizes understanding. Has clinic number.    Anticoagulation Summary  As of 2020    INR goal:  2.0-3.0   TTR:  70.2 % (2.7 y)   INR used for dosin.8 (12/3/2020)   Warfarin maintenance plan:  3 mg (3 mg x 1) every Jacobs, , ; 4.5 mg (3 mg x 1.5) all other days   Weekly warfarin total:  27 mg   Plan last modified:  Joanna Chisholm RN (2020)   Next INR check:  2020   Priority:  Follow-Up - 4 Weeks   Target end date:  Indefinite    Indications    Persistent atrial fibrillation (CMS/[facility]) [I48.20]  Long term (current) use of anticoagulants [Z79.01]  Encounter for therapeutic drug monitoring [Z51.81]             Anticoagulation Episode Summary     INR check location:  Anticoagulation Clinic    Preferred lab:      Send INR reminders to:  [facility] (OPEN ENROLLMENT) ACS CARD/EP    Comments:  *Next STAC due 21; ACC; 2.5mg & 3mg tablets; CC'd lab       Anticoagulation Care Providers     Provider Role Specialty Phone number    Ector Madison MD Referring Cardiovascular Disease [phone]          Supervising provider: Hector Burleson MD      
No

## 2021-04-08 ENCOUNTER — APPOINTMENT (OUTPATIENT)
Dept: OBGYN | Facility: CLINIC | Age: 46
End: 2021-04-08
Payer: MEDICAID

## 2021-04-08 ENCOUNTER — EMERGENCY (EMERGENCY)
Facility: HOSPITAL | Age: 46
LOS: 1 days | Discharge: HOME | End: 2021-04-08
Attending: EMERGENCY MEDICINE
Payer: COMMERCIAL

## 2021-04-08 VITALS
DIASTOLIC BLOOD PRESSURE: 81 MMHG | SYSTOLIC BLOOD PRESSURE: 125 MMHG | WEIGHT: 258.31 LBS | BODY MASS INDEX: 40.46 KG/M2

## 2021-04-08 VITALS
TEMPERATURE: 98 F | RESPIRATION RATE: 17 BRPM | SYSTOLIC BLOOD PRESSURE: 152 MMHG | HEART RATE: 66 BPM | HEIGHT: 67 IN | DIASTOLIC BLOOD PRESSURE: 94 MMHG | WEIGHT: 248.02 LBS | OXYGEN SATURATION: 99 %

## 2021-04-08 VITALS
RESPIRATION RATE: 19 BRPM | HEART RATE: 70 BPM | DIASTOLIC BLOOD PRESSURE: 84 MMHG | SYSTOLIC BLOOD PRESSURE: 135 MMHG | OXYGEN SATURATION: 99 %

## 2021-04-08 DIAGNOSIS — Z98.890 OTHER SPECIFIED POSTPROCEDURAL STATES: Chronic | ICD-10-CM

## 2021-04-08 DIAGNOSIS — N80.9 ENDOMETRIOSIS, UNSPECIFIED: Chronic | ICD-10-CM

## 2021-04-08 LAB
ALBUMIN SERPL ELPH-MCNC: 4.2 G/DL — SIGNIFICANT CHANGE UP (ref 3.3–5.2)
ALP SERPL-CCNC: 79 U/L — SIGNIFICANT CHANGE UP (ref 40–120)
ALT FLD-CCNC: 19 U/L — SIGNIFICANT CHANGE UP
ANION GAP SERPL CALC-SCNC: 11 MMOL/L — SIGNIFICANT CHANGE UP (ref 5–17)
APPEARANCE UR: CLEAR — SIGNIFICANT CHANGE UP
APTT BLD: 36.1 SEC — HIGH (ref 27.5–35.5)
AST SERPL-CCNC: 16 U/L — SIGNIFICANT CHANGE UP
BACTERIA # UR AUTO: ABNORMAL
BASOPHILS # BLD AUTO: 0.02 K/UL — SIGNIFICANT CHANGE UP (ref 0–0.2)
BASOPHILS NFR BLD AUTO: 0.3 % — SIGNIFICANT CHANGE UP (ref 0–2)
BILIRUB SERPL-MCNC: 0.4 MG/DL — SIGNIFICANT CHANGE UP (ref 0.4–2)
BILIRUB UR-MCNC: NEGATIVE — SIGNIFICANT CHANGE UP
BLD GP AB SCN SERPL QL: SIGNIFICANT CHANGE UP
BUN SERPL-MCNC: 9 MG/DL — SIGNIFICANT CHANGE UP (ref 8–20)
CALCIUM SERPL-MCNC: 9 MG/DL — SIGNIFICANT CHANGE UP (ref 8.6–10.2)
CHLORIDE SERPL-SCNC: 104 MMOL/L — SIGNIFICANT CHANGE UP (ref 98–107)
CO2 SERPL-SCNC: 27 MMOL/L — SIGNIFICANT CHANGE UP (ref 22–29)
COLOR SPEC: YELLOW — SIGNIFICANT CHANGE UP
CREAT SERPL-MCNC: 0.61 MG/DL — SIGNIFICANT CHANGE UP (ref 0.5–1.3)
DIFF PNL FLD: ABNORMAL
EOSINOPHIL # BLD AUTO: 0.04 K/UL — SIGNIFICANT CHANGE UP (ref 0–0.5)
EOSINOPHIL NFR BLD AUTO: 0.7 % — SIGNIFICANT CHANGE UP (ref 0–6)
EPI CELLS # UR: SIGNIFICANT CHANGE UP
GLUCOSE SERPL-MCNC: 86 MG/DL — SIGNIFICANT CHANGE UP (ref 70–99)
GLUCOSE UR QL: NEGATIVE MG/DL — SIGNIFICANT CHANGE UP
HCG SERPL-ACNC: <4 MIU/ML — SIGNIFICANT CHANGE UP
HCT VFR BLD CALC: 41.7 % — SIGNIFICANT CHANGE UP (ref 34.5–45)
HGB BLD-MCNC: 14 G/DL — SIGNIFICANT CHANGE UP (ref 11.5–15.5)
IMM GRANULOCYTES NFR BLD AUTO: 0.3 % — SIGNIFICANT CHANGE UP (ref 0–1.5)
INR BLD: 1.06 RATIO — SIGNIFICANT CHANGE UP (ref 0.88–1.16)
KETONES UR-MCNC: NEGATIVE — SIGNIFICANT CHANGE UP
LEUKOCYTE ESTERASE UR-ACNC: NEGATIVE — SIGNIFICANT CHANGE UP
LYMPHOCYTES # BLD AUTO: 1.37 K/UL — SIGNIFICANT CHANGE UP (ref 1–3.3)
LYMPHOCYTES # BLD AUTO: 22.3 % — SIGNIFICANT CHANGE UP (ref 13–44)
MCHC RBC-ENTMCNC: 29.7 PG — SIGNIFICANT CHANGE UP (ref 27–34)
MCHC RBC-ENTMCNC: 33.6 GM/DL — SIGNIFICANT CHANGE UP (ref 32–36)
MCV RBC AUTO: 88.3 FL — SIGNIFICANT CHANGE UP (ref 80–100)
MONOCYTES # BLD AUTO: 0.5 K/UL — SIGNIFICANT CHANGE UP (ref 0–0.9)
MONOCYTES NFR BLD AUTO: 8.1 % — SIGNIFICANT CHANGE UP (ref 2–14)
NEUTROPHILS # BLD AUTO: 4.19 K/UL — SIGNIFICANT CHANGE UP (ref 1.8–7.4)
NEUTROPHILS NFR BLD AUTO: 68.3 % — SIGNIFICANT CHANGE UP (ref 43–77)
NITRITE UR-MCNC: NEGATIVE — SIGNIFICANT CHANGE UP
PH UR: 6 — SIGNIFICANT CHANGE UP (ref 5–8)
PLATELET # BLD AUTO: 292 K/UL — SIGNIFICANT CHANGE UP (ref 150–400)
POTASSIUM SERPL-MCNC: 3.8 MMOL/L — SIGNIFICANT CHANGE UP (ref 3.5–5.3)
POTASSIUM SERPL-SCNC: 3.8 MMOL/L — SIGNIFICANT CHANGE UP (ref 3.5–5.3)
PROT SERPL-MCNC: 6.9 G/DL — SIGNIFICANT CHANGE UP (ref 6.6–8.7)
PROT UR-MCNC: 30 MG/DL
PROTHROM AB SERPL-ACNC: 12.3 SEC — SIGNIFICANT CHANGE UP (ref 10.6–13.6)
RBC # BLD: 4.72 M/UL — SIGNIFICANT CHANGE UP (ref 3.8–5.2)
RBC # FLD: 12.8 % — SIGNIFICANT CHANGE UP (ref 10.3–14.5)
RBC CASTS # UR COMP ASSIST: ABNORMAL /HPF (ref 0–4)
SARS-COV-2 RNA SPEC QL NAA+PROBE: SIGNIFICANT CHANGE UP
SODIUM SERPL-SCNC: 142 MMOL/L — SIGNIFICANT CHANGE UP (ref 135–145)
SP GR SPEC: 1.01 — SIGNIFICANT CHANGE UP (ref 1.01–1.02)
UROBILINOGEN FLD QL: NEGATIVE MG/DL — SIGNIFICANT CHANGE UP
WBC # BLD: 6.14 K/UL — SIGNIFICANT CHANGE UP (ref 3.8–10.5)
WBC # FLD AUTO: 6.14 K/UL — SIGNIFICANT CHANGE UP (ref 3.8–10.5)
WBC UR QL: SIGNIFICANT CHANGE UP

## 2021-04-08 PROCEDURE — 99072 ADDL SUPL MATRL&STAF TM PHE: CPT

## 2021-04-08 PROCEDURE — 99284 EMERGENCY DEPT VISIT MOD MDM: CPT | Mod: 25

## 2021-04-08 PROCEDURE — 85610 PROTHROMBIN TIME: CPT

## 2021-04-08 PROCEDURE — 76856 US EXAM PELVIC COMPLETE: CPT

## 2021-04-08 PROCEDURE — U0003: CPT

## 2021-04-08 PROCEDURE — 85730 THROMBOPLASTIN TIME PARTIAL: CPT

## 2021-04-08 PROCEDURE — 85025 COMPLETE CBC W/AUTO DIFF WBC: CPT

## 2021-04-08 PROCEDURE — U0005: CPT

## 2021-04-08 PROCEDURE — 80053 COMPREHEN METABOLIC PANEL: CPT

## 2021-04-08 PROCEDURE — 81001 URINALYSIS AUTO W/SCOPE: CPT

## 2021-04-08 PROCEDURE — 36415 COLL VENOUS BLD VENIPUNCTURE: CPT

## 2021-04-08 PROCEDURE — 86900 BLOOD TYPING SEROLOGIC ABO: CPT

## 2021-04-08 PROCEDURE — 87086 URINE CULTURE/COLONY COUNT: CPT

## 2021-04-08 PROCEDURE — 86850 RBC ANTIBODY SCREEN: CPT

## 2021-04-08 PROCEDURE — 84702 CHORIONIC GONADOTROPIN TEST: CPT

## 2021-04-08 PROCEDURE — 99396 PREV VISIT EST AGE 40-64: CPT

## 2021-04-08 PROCEDURE — 86901 BLOOD TYPING SEROLOGIC RH(D): CPT

## 2021-04-08 PROCEDURE — 99284 EMERGENCY DEPT VISIT MOD MDM: CPT

## 2021-04-08 PROCEDURE — 76830 TRANSVAGINAL US NON-OB: CPT

## 2021-04-08 PROCEDURE — 76830 TRANSVAGINAL US NON-OB: CPT | Mod: 26

## 2021-04-08 PROCEDURE — 76856 US EXAM PELVIC COMPLETE: CPT | Mod: 26

## 2021-04-08 RX ORDER — SODIUM CHLORIDE 9 MG/ML
1000 INJECTION INTRAMUSCULAR; INTRAVENOUS; SUBCUTANEOUS ONCE
Refills: 0 | Status: COMPLETED | OUTPATIENT
Start: 2021-04-08 | End: 2021-04-08

## 2021-04-08 RX ADMIN — SODIUM CHLORIDE 1000 MILLILITER(S): 9 INJECTION INTRAMUSCULAR; INTRAVENOUS; SUBCUTANEOUS at 17:14

## 2021-04-08 NOTE — PHYSICAL EXAM
[Appropriately responsive] : appropriately responsive [Alert] : alert [No Acute Distress] : no acute distress [No Lymphadenopathy] : no lymphadenopathy [Regular Rate Rhythm] : regular rate rhythm [No Murmurs] : no murmurs [Clear to Auscultation B/L] : clear to auscultation bilaterally [Soft] : soft [Non-tender] : non-tender [Non-distended] : non-distended [No HSM] : No HSM [No Lesions] : no lesions [No Mass] : no mass [Oriented x3] : oriented x3 [Examination Of The Breasts] : a normal appearance [No Masses] : no breast masses were palpable [Labia Majora] : normal [Labia Minora] : normal [Scant] : There was scant vaginal bleeding [Normal] : normal [Enlarged ___ wks] : enlarged [unfilled] ~Uweeks [Uterine Adnexae] : normal

## 2021-04-08 NOTE — ED PROVIDER NOTE - NS ED ROS FT
Constitutional: (-) fever  (-)chills  (-)sweats (+) weakness  Eyes/ENT: (-) blurry vision, (-) epistaxis  (-)rhinorrhea   (-) sore throat    Cardiovascular: (-) chest pain, (-) palpitations (-) edema   Respiratory: (-) cough, (-) shortness of breath   Gastrointestinal: (-)nausea  (-)vomiting, (-) diarrhea  (-) abdominal pain   : (+) vaginal bleeding (-)dysuria, (-)frequency, (-)urgency, (-)hematuria  Musculoskeletal: (-) neck pain, (-) back pain, (-) joint pain  Integumentary: (-) rash, (-) edema  Neurological: (-) headache, (-) altered mental status  (-)LOC Constitutional: (-) fever  (-)chills  (-)sweats (+) weakness  Eyes/ENT: (-)   Cardiovascular: (-) chest pain, (-) palpitations (-) edema   Respiratory: (-) cough, (-) shortness of breath   Gastrointestinal: (-)nausea  (-)vomiting, (-) diarrhea  (-) abdominal pain   : (+) vaginal bleeding (-)dysuria, (-)frequency, (-)urgency, (-)hematuria  Musculoskeletal: (-) neck pain, (-) back pain, (-) joint pain  Integumentary: (-)   Neurological: (-) headache, (-) altered mental status  (-)LOC

## 2021-04-08 NOTE — ED ADULT TRIAGE NOTE - CHIEF COMPLAINT QUOTE
pt c/o heavy vaginal bleeding and states she felt like she passed out earlier when sitting down.  hx of blood transfusions secondary to bleeding.  respirations even and unlabored at this time, skin warm and dry.

## 2021-04-08 NOTE — CONSULT NOTE ADULT - SUBJECTIVE AND OBJECTIVE BOX
Patient is a 45yo  LMP with a known history of AUB and adenomyosis s/p excision of portion of uterus who was sent from the office for symptomatic anemia due to AUB. She endorses heavy vaginal bleeding since . She endorses lightheadedness and dizziness. Patient denies fevers, chills, vaginal discharge, palpitations, shortness of breath and chest pain. No other complaints at this time. Of note, patient was seen in the office today by Dr. House, she was prescribed tranexamic acid, iron and was told to come to the ED for a TVUS and CBC.     POB: SAB x4 ?  PGYN: Adenomyosis, hx fibroids, hx of cysts, denies STD hx, denies abnormal PAP smears   PMH: Denies  PSH: Uterine surgery (2018), D&C  Meds: Metformin?   All: NKDA    Vital Signs Last 24 Hrs  T(C): 36.8 (2021 16:12), Max: 36.8 (2021 16:12)  T(F): 98.3 (2021 16:12), Max: 98.3 (2021 16:12)  HR: 66 (2021 16:12) (66 - 66)  BP: 152/94 (2021 16:12) (152/94 - 152/94)  RR: 17 (2021 16:12) (17 - 17)  SpO2: 99% (2021 16:12) (99% - 99%)     General: NAD, well-appearing  Heart: RRR  Lungs: CTAB  Abdominal: soft, non-tender, non-distended, no rebound or guarding, +BS  Ext: non-tender, non-edematous   Pelvic: Exam done in the office by Dr. House, scant bleeding in vaginal vault, 11 weeks sized uterus      Patient is a 47yo  LMP with a known history of AUB and adenomyosis s/p excision of portion of uterus who was sent from the office for symptomatic anemia due to AUB. She endorses heavy vaginal bleeding since . She endorses lightheadedness and dizziness. Patient denies fevers, chills, vaginal discharge, palpitations, shortness of breath and chest pain. No other complaints at this time. Of note, patient was seen in the office today by Dr. House, she was prescribed tranexamic acid, iron and was told to come to the ED for a TVUS and CBC.     POB: SAB x4 ?  PGYN: Adenomyosis, hx fibroids, hx of cysts, denies STD hx, denies abnormal PAP smears   PMH: Denies  PSH: Uterine surgery (2018), D&C  Meds: Metformin?   All: NKDA    Vital Signs Last 24 Hrs  T(C): 36.8 (2021 16:12), Max: 36.8 (2021 16:12)  T(F): 98.3 (2021 16:12), Max: 98.3 (2021 16:12)  HR: 66 (2021 16:12) (66 - 66)  BP: 152/94 (2021 16:12) (152/94 - 152/94)  RR: 17 (2021 16:12) (17 - 17)  SpO2: 99% (2021 16:12) (99% - 99%)     Physical exam done in the office and available through [employer] by Dr. House, only significant for scant bleeding in vaginal vault, 11 weeks sized uterus.                           14.0   6.14  )-----------( 292      ( 2021 18:00 )             41.7     < from: US Transvaginal (21 @ 18:36) >  FINDINGS:    Uterus: 8.1 cm x 5.5 cm x 6.0 cm. The myometrium is diffusely heterogeneous. There is a 2.8 x 3.4 cm suspected fundal fibroid.  Endometrium: 5 mm. Suboptimally visualized.    Right ovary: 2.2 cm x 1.5 cm x 1.4 cm. Within normal limits.  Left ovary: 2.6 cm x 1.6 cm x 1.5 cm. Within normal limits.    Fluid: None.    IMPRESSION:  Findings suggestive of adenomyosis. There is a suspected 3.4 cm fundal fibroid.    < end of copied text >

## 2021-04-08 NOTE — CONSULT NOTE ADULT - ASSESSMENT
A/P: 47yo  LMP with a known history of AUB and adenomyosis s/p excision of portion of uterus who was sent from the office for suspected  symptomatic anemia due to AUB. Gynecologic assessment performed outpatient by attending. Pelvic ultrasound confirming known adenomyosis. Patient noted to be hemodynamically stable without active vaginal bleeding and hemoglobin 14. No inpatient role for GYN at this time. Patient does not require blood products. Patient will follow up with GYN and PCP outpatient.     D/W Dr. House

## 2021-04-08 NOTE — ED PROVIDER NOTE - NSFOLLOWUPINSTRUCTIONS_ED_ALL_ED_FT
follow up with Dr. House - return to ED if you experience worsening symptoms such as dizziness, palpitations, lightheadedness or increase in bleeding

## 2021-04-08 NOTE — ED PROVIDER NOTE - CLINICAL SUMMARY MEDICAL DECISION MAKING FREE TEXT BOX
DUB with symptomatic anemia: Labs, IV fluids, type and screen, US, GYN consult, and possible transfusion

## 2021-04-08 NOTE — ED PROVIDER NOTE - PATIENT PORTAL LINK FT
You can access the FollowMyHealth Patient Portal offered by [facility] by registering at the following website: [email or web address]. By joining [employer]’s FollowMyHealth portal, you will also be able to view your health information using other applications (apps) compatible with our system.

## 2021-04-08 NOTE — ED PROVIDER NOTE - OBJECTIVE STATEMENT
HPI: 45 y/o F; with PMH signif for endomertiosis, fibroids, adenomyosis, myomectomy now p/w vaginal bleeding- sent by OBGYN. Pt has been bleeding since feb 16.  Pt states she has been losing a lot of blood and had been using 8-9 pads per day. Pt woke up this morning feeling weak and lost her vision but remained conscious. Pt denies falling and LOC. Pt has had 2 previously blood transfusion, the last one five years ago.   Dr bah is OBGYN    PMH:  SOCIAL: +social EtOH use, denies tobacco/illicit drug use HPI: 45 y/o F; with PMH signif for endomertiosis, fibroids, adenomyosis; now p/w vaginal bleeding- sent by OBGYN. Pt has been bleeding since feb 16.  Pt states she has been losing a lot of blood and had been using 8-9 pads with 8 tampons per day. Pt woke up this morning feeling weak and had episode of near syncope this morning while attempting to go to bathroom. Pt denies falling and denies LOC. Pt has had 2 previously blood transfusion, the last one five years ago.   OBGYN:  Dr bah  PMH:endomertiosis, fibroids, adenomyosis, anemia  SOCIAL: +social EtOH use, denies tobacco/illicit drug use

## 2021-04-10 LAB
CULTURE RESULTS: SIGNIFICANT CHANGE UP
SPECIMEN SOURCE: SIGNIFICANT CHANGE UP

## 2021-04-13 LAB — CYTOLOGY CVX/VAG DOC THIN PREP: ABNORMAL

## 2021-04-19 RX ORDER — NORETHINDRONE ACETATE AND ETHINYL ESTRADIOL 1.5; 3 MG/1; UG/1
1.5-3 TABLET ORAL
Qty: 56 | Refills: 1 | Status: COMPLETED | COMMUNITY
Start: 2018-06-08 | End: 2021-04-19

## 2021-04-19 RX ORDER — NORETHINDRONE ACETATE AND ETHINYL ESTRADIOL 1.5; 3 MG/1; UG/1
1.5-3 TABLET ORAL
Qty: 56 | Refills: 1 | Status: COMPLETED | COMMUNITY
Start: 2018-07-11 | End: 2021-04-19

## 2021-04-19 RX ORDER — NORETHINDRONE ACETATE AND ETHINYL ESTRADIOL AND FERROUS FUMARATE 1.5-30(21)
1.5-3 KIT ORAL DAILY
Qty: 28 | Refills: 3 | Status: COMPLETED | COMMUNITY
Start: 2018-06-01 | End: 2021-04-19

## 2021-04-19 RX ORDER — NORETHINDRONE ACETATE AND ETHINYL ESTRADIOL 1.5; 3 MG/1; UG/1
1.5-3 TABLET ORAL DAILY
Qty: 28 | Refills: 3 | Status: COMPLETED | COMMUNITY
Start: 2017-11-29 | End: 2021-04-19

## 2021-04-19 RX ORDER — NORETHINDRONE ACETATE AND ETHINYL ESTRADIOL 1.5; 3 MG/1; UG/1
1.5-3 TABLET ORAL
Qty: 56 | Refills: 1 | Status: COMPLETED | COMMUNITY
Start: 2018-08-20 | End: 2021-04-19

## 2021-04-21 ENCOUNTER — APPOINTMENT (OUTPATIENT)
Dept: OBGYN | Facility: CLINIC | Age: 46
End: 2021-04-21
Payer: MEDICAID

## 2021-04-21 PROCEDURE — 99213 OFFICE O/P EST LOW 20 MIN: CPT | Mod: 95

## 2021-04-21 NOTE — REASON FOR VISIT
[Home] : at home, [unfilled] , at the time of the visit. [Medical Office: ([facility])___] : at the medical office located in  [Verbal consent obtained from patient] : the patient, [unfilled] [Follow-Up] : a follow-up evaluation of

## 2021-08-19 ENCOUNTER — APPOINTMENT (OUTPATIENT)
Dept: OBGYN | Facility: CLINIC | Age: 46
End: 2021-08-19
Payer: MEDICAID

## 2021-08-19 VITALS
BODY MASS INDEX: 41.78 KG/M2 | DIASTOLIC BLOOD PRESSURE: 70 MMHG | WEIGHT: 266.2 LBS | SYSTOLIC BLOOD PRESSURE: 120 MMHG | HEIGHT: 67 IN

## 2021-08-19 PROCEDURE — 99213 OFFICE O/P EST LOW 20 MIN: CPT

## 2021-08-20 LAB
BASOPHILS # BLD AUTO: 0.02 K/UL
BASOPHILS NFR BLD AUTO: 0.3 %
EOSINOPHIL # BLD AUTO: 0.04 K/UL
EOSINOPHIL NFR BLD AUTO: 0.6 %
ESTIMATED AVERAGE GLUCOSE: 97 MG/DL
FSH SERPL-MCNC: 3 IU/L
HBA1C MFR BLD HPLC: 5 %
HCG SERPL QL: NEGATIVE
HCT VFR BLD CALC: 35.2 %
HGB BLD-MCNC: 10.9 G/DL
IMM GRANULOCYTES NFR BLD AUTO: 0.3 %
LH SERPL-ACNC: 1.7 IU/L
LYMPHOCYTES # BLD AUTO: 1.18 K/UL
LYMPHOCYTES NFR BLD AUTO: 18.5 %
MAN DIFF?: NORMAL
MCHC RBC-ENTMCNC: 27.1 PG
MCHC RBC-ENTMCNC: 31 GM/DL
MCV RBC AUTO: 87.6 FL
MONOCYTES # BLD AUTO: 0.34 K/UL
MONOCYTES NFR BLD AUTO: 5.3 %
NEUTROPHILS # BLD AUTO: 4.78 K/UL
NEUTROPHILS NFR BLD AUTO: 75 %
PAPP-A SERPL-ACNC: <1 MIU/ML
PLATELET # BLD AUTO: 283 K/UL
PROLACTIN SERPL-MCNC: 13 NG/ML
RBC # BLD: 4.02 M/UL
RBC # FLD: 14.4 %
T3FREE SERPL-MCNC: 2.96 PG/ML
T4 FREE SERPL-MCNC: 1 NG/DL
TESTOST SERPL-MCNC: 33 NG/DL
TSH SERPL-ACNC: 2.36 UIU/ML
WBC # FLD AUTO: 6.38 K/UL

## 2021-09-23 ENCOUNTER — APPOINTMENT (OUTPATIENT)
Dept: OBGYN | Facility: CLINIC | Age: 46
End: 2021-09-23

## 2021-10-13 ENCOUNTER — APPOINTMENT (OUTPATIENT)
Dept: OBGYN | Facility: CLINIC | Age: 46
End: 2021-10-13

## 2021-10-18 ENCOUNTER — NON-APPOINTMENT (OUTPATIENT)
Age: 46
End: 2021-10-18

## 2021-10-20 ENCOUNTER — APPOINTMENT (OUTPATIENT)
Dept: OBGYN | Facility: CLINIC | Age: 46
End: 2021-10-20
Payer: MEDICAID

## 2021-10-20 VITALS
BODY MASS INDEX: 39.88 KG/M2 | DIASTOLIC BLOOD PRESSURE: 79 MMHG | HEART RATE: 71 BPM | HEIGHT: 67 IN | SYSTOLIC BLOOD PRESSURE: 136 MMHG | WEIGHT: 254.06 LBS

## 2021-10-20 DIAGNOSIS — N84.1 POLYP OF CERVIX UTERI: ICD-10-CM

## 2021-10-20 DIAGNOSIS — K62.5 HEMORRHAGE OF ANUS AND RECTUM: ICD-10-CM

## 2021-10-20 PROCEDURE — 99213 OFFICE O/P EST LOW 20 MIN: CPT

## 2021-10-20 NOTE — PHYSICAL EXAM
[Appropriately responsive] : appropriately responsive [Alert] : alert [No Acute Distress] : no acute distress [No Lymphadenopathy] : no lymphadenopathy [Regular Rate Rhythm] : regular rate rhythm [No Murmurs] : no murmurs [Clear to Auscultation B/L] : clear to auscultation bilaterally [Soft] : soft [Non-tender] : non-tender [Non-distended] : non-distended [No HSM] : No HSM [No Lesions] : no lesions [No Mass] : no mass [Oriented x3] : oriented x3 [Examination Of The Breasts] : a normal appearance [No Masses] : no breast masses were palpable [Labia Majora] : normal [Labia Minora] : normal [Moderate] : There was moderate vaginal bleeding [Normal] : normal [Polyp ___ cm] : [unfilled] ~[facility] polyp [Uterine Adnexae] : normal

## 2021-10-21 LAB
BASOPHILS # BLD AUTO: 0.03 K/UL
BASOPHILS NFR BLD AUTO: 0.5 %
EOSINOPHIL # BLD AUTO: 0.03 K/UL
EOSINOPHIL NFR BLD AUTO: 0.5 %
HCT VFR BLD CALC: 39.4 %
HGB BLD-MCNC: 12.1 G/DL
IMM GRANULOCYTES NFR BLD AUTO: 0.3 %
LYMPHOCYTES # BLD AUTO: 1.34 K/UL
LYMPHOCYTES NFR BLD AUTO: 21.8 %
MAN DIFF?: NORMAL
MCHC RBC-ENTMCNC: 26.2 PG
MCHC RBC-ENTMCNC: 30.7 GM/DL
MCV RBC AUTO: 85.3 FL
MONOCYTES # BLD AUTO: 0.51 K/UL
MONOCYTES NFR BLD AUTO: 8.3 %
NEUTROPHILS # BLD AUTO: 4.22 K/UL
NEUTROPHILS NFR BLD AUTO: 68.6 %
PLATELET # BLD AUTO: 365 K/UL
RBC # BLD: 4.62 M/UL
RBC # FLD: 16 %
WBC # FLD AUTO: 6.15 K/UL

## 2021-10-22 ENCOUNTER — TRANSCRIPTION ENCOUNTER (OUTPATIENT)
Age: 46
End: 2021-10-22

## 2021-10-29 ENCOUNTER — OUTPATIENT (OUTPATIENT)
Dept: OUTPATIENT SERVICES | Facility: HOSPITAL | Age: 46
LOS: 1 days | End: 2021-10-29
Payer: COMMERCIAL

## 2021-10-29 DIAGNOSIS — Z01.818 ENCOUNTER FOR OTHER PREPROCEDURAL EXAMINATION: ICD-10-CM

## 2021-10-29 DIAGNOSIS — Z98.890 OTHER SPECIFIED POSTPROCEDURAL STATES: Chronic | ICD-10-CM

## 2021-10-29 DIAGNOSIS — N80.9 ENDOMETRIOSIS, UNSPECIFIED: Chronic | ICD-10-CM

## 2021-10-29 LAB
A1C WITH ESTIMATED AVERAGE GLUCOSE RESULT: 5.1 % — SIGNIFICANT CHANGE UP (ref 4–5.6)
ANION GAP SERPL CALC-SCNC: 10 MMOL/L — SIGNIFICANT CHANGE UP (ref 5–17)
APPEARANCE UR: CLEAR — SIGNIFICANT CHANGE UP
APTT BLD: 33.5 SEC — SIGNIFICANT CHANGE UP (ref 27.5–35.5)
BASOPHILS # BLD AUTO: 0.03 K/UL — SIGNIFICANT CHANGE UP (ref 0–0.2)
BASOPHILS NFR BLD AUTO: 0.5 % — SIGNIFICANT CHANGE UP (ref 0–2)
BILIRUB UR-MCNC: NEGATIVE — SIGNIFICANT CHANGE UP
BUN SERPL-MCNC: 10.9 MG/DL — SIGNIFICANT CHANGE UP (ref 8–20)
CALCIUM SERPL-MCNC: 9.1 MG/DL — SIGNIFICANT CHANGE UP (ref 8.6–10.2)
CHLORIDE SERPL-SCNC: 105 MMOL/L — SIGNIFICANT CHANGE UP (ref 98–107)
CO2 SERPL-SCNC: 28 MMOL/L — SIGNIFICANT CHANGE UP (ref 22–29)
COLOR SPEC: YELLOW — SIGNIFICANT CHANGE UP
CREAT SERPL-MCNC: 0.64 MG/DL — SIGNIFICANT CHANGE UP (ref 0.5–1.3)
DIFF PNL FLD: NEGATIVE — SIGNIFICANT CHANGE UP
EOSINOPHIL # BLD AUTO: 0.03 K/UL — SIGNIFICANT CHANGE UP (ref 0–0.5)
EOSINOPHIL NFR BLD AUTO: 0.5 % — SIGNIFICANT CHANGE UP (ref 0–6)
ESTIMATED AVERAGE GLUCOSE: 100 MG/DL — SIGNIFICANT CHANGE UP (ref 68–114)
GLUCOSE SERPL-MCNC: 103 MG/DL — HIGH (ref 70–99)
GLUCOSE UR QL: NEGATIVE MG/DL — SIGNIFICANT CHANGE UP
HCG UR QL: NEGATIVE — SIGNIFICANT CHANGE UP
HCT VFR BLD CALC: 36.4 % — SIGNIFICANT CHANGE UP (ref 34.5–45)
HGB BLD-MCNC: 11.4 G/DL — LOW (ref 11.5–15.5)
IMM GRANULOCYTES NFR BLD AUTO: 0.2 % — SIGNIFICANT CHANGE UP (ref 0–1.5)
INR BLD: 0.96 RATIO — SIGNIFICANT CHANGE UP (ref 0.88–1.16)
KETONES UR-MCNC: NEGATIVE — SIGNIFICANT CHANGE UP
LEUKOCYTE ESTERASE UR-ACNC: NEGATIVE — SIGNIFICANT CHANGE UP
LYMPHOCYTES # BLD AUTO: 1.14 K/UL — SIGNIFICANT CHANGE UP (ref 1–3.3)
LYMPHOCYTES # BLD AUTO: 20 % — SIGNIFICANT CHANGE UP (ref 13–44)
MCHC RBC-ENTMCNC: 26 PG — LOW (ref 27–34)
MCHC RBC-ENTMCNC: 31.3 GM/DL — LOW (ref 32–36)
MCV RBC AUTO: 82.9 FL — SIGNIFICANT CHANGE UP (ref 80–100)
MONOCYTES # BLD AUTO: 0.6 K/UL — SIGNIFICANT CHANGE UP (ref 0–0.9)
MONOCYTES NFR BLD AUTO: 10.5 % — SIGNIFICANT CHANGE UP (ref 2–14)
NEUTROPHILS # BLD AUTO: 3.88 K/UL — SIGNIFICANT CHANGE UP (ref 1.8–7.4)
NEUTROPHILS NFR BLD AUTO: 68.3 % — SIGNIFICANT CHANGE UP (ref 43–77)
NITRITE UR-MCNC: NEGATIVE — SIGNIFICANT CHANGE UP
PH UR: 7 — SIGNIFICANT CHANGE UP (ref 5–8)
PLATELET # BLD AUTO: 324 K/UL — SIGNIFICANT CHANGE UP (ref 150–400)
POTASSIUM SERPL-MCNC: 4.3 MMOL/L — SIGNIFICANT CHANGE UP (ref 3.5–5.3)
POTASSIUM SERPL-SCNC: 4.3 MMOL/L — SIGNIFICANT CHANGE UP (ref 3.5–5.3)
PROT UR-MCNC: NEGATIVE MG/DL — SIGNIFICANT CHANGE UP
PROTHROM AB SERPL-ACNC: 11.2 SEC — SIGNIFICANT CHANGE UP (ref 10.6–13.6)
RBC # BLD: 4.39 M/UL — SIGNIFICANT CHANGE UP (ref 3.8–5.2)
RBC # FLD: 15 % — HIGH (ref 10.3–14.5)
SODIUM SERPL-SCNC: 143 MMOL/L — SIGNIFICANT CHANGE UP (ref 135–145)
SP GR SPEC: 1 — LOW (ref 1.01–1.02)
UROBILINOGEN FLD QL: NEGATIVE MG/DL — SIGNIFICANT CHANGE UP
WBC # BLD: 5.69 K/UL — SIGNIFICANT CHANGE UP (ref 3.8–10.5)
WBC # FLD AUTO: 5.69 K/UL — SIGNIFICANT CHANGE UP (ref 3.8–10.5)

## 2021-10-29 PROCEDURE — 93010 ELECTROCARDIOGRAM REPORT: CPT

## 2021-10-29 PROCEDURE — 80048 BASIC METABOLIC PNL TOTAL CA: CPT

## 2021-10-29 PROCEDURE — 83036 HEMOGLOBIN GLYCOSYLATED A1C: CPT

## 2021-10-29 PROCEDURE — 93005 ELECTROCARDIOGRAM TRACING: CPT

## 2021-10-29 PROCEDURE — 87086 URINE CULTURE/COLONY COUNT: CPT

## 2021-10-29 PROCEDURE — 85025 COMPLETE CBC W/AUTO DIFF WBC: CPT

## 2021-10-29 PROCEDURE — 81025 URINE PREGNANCY TEST: CPT

## 2021-10-29 PROCEDURE — 85610 PROTHROMBIN TIME: CPT

## 2021-10-29 PROCEDURE — 36415 COLL VENOUS BLD VENIPUNCTURE: CPT

## 2021-10-29 PROCEDURE — 81003 URINALYSIS AUTO W/O SCOPE: CPT

## 2021-10-29 PROCEDURE — 85730 THROMBOPLASTIN TIME PARTIAL: CPT

## 2021-10-29 PROCEDURE — G0463: CPT

## 2021-10-30 LAB
CULTURE RESULTS: SIGNIFICANT CHANGE UP
SPECIMEN SOURCE: SIGNIFICANT CHANGE UP

## 2021-10-31 DIAGNOSIS — Z01.818 ENCOUNTER FOR OTHER PREPROCEDURAL EXAMINATION: ICD-10-CM

## 2021-11-01 ENCOUNTER — APPOINTMENT (OUTPATIENT)
Dept: DISASTER EMERGENCY | Facility: CLINIC | Age: 46
End: 2021-11-01

## 2021-11-01 ENCOUNTER — OUTPATIENT (OUTPATIENT)
Dept: OUTPATIENT SERVICES | Facility: HOSPITAL | Age: 46
LOS: 1 days | End: 2021-11-01

## 2021-11-01 ENCOUNTER — APPOINTMENT (OUTPATIENT)
Dept: ULTRASOUND IMAGING | Facility: CLINIC | Age: 46
End: 2021-11-01
Payer: MEDICAID

## 2021-11-01 DIAGNOSIS — N92.0 EXCESSIVE AND FREQUENT MENSTRUATION WITH REGULAR CYCLE: ICD-10-CM

## 2021-11-01 DIAGNOSIS — N80.9 ENDOMETRIOSIS, UNSPECIFIED: Chronic | ICD-10-CM

## 2021-11-01 DIAGNOSIS — Z98.890 OTHER SPECIFIED POSTPROCEDURAL STATES: Chronic | ICD-10-CM

## 2021-11-01 PROCEDURE — 76856 US EXAM PELVIC COMPLETE: CPT | Mod: 26

## 2021-11-02 ENCOUNTER — TRANSCRIPTION ENCOUNTER (OUTPATIENT)
Age: 46
End: 2021-11-02

## 2021-11-02 LAB — SARS-COV-2 N GENE NPH QL NAA+PROBE: NOT DETECTED

## 2021-11-04 ENCOUNTER — RESULT REVIEW (OUTPATIENT)
Age: 46
End: 2021-11-04

## 2021-11-04 ENCOUNTER — APPOINTMENT (OUTPATIENT)
Dept: OBGYN | Facility: AMBULATORY SURGERY CENTER | Age: 46
End: 2021-11-04
Payer: MEDICAID

## 2021-11-04 PROCEDURE — 58558 HYSTEROSCOPY BIOPSY: CPT

## 2021-11-11 ENCOUNTER — APPOINTMENT (OUTPATIENT)
Dept: OBGYN | Facility: CLINIC | Age: 46
End: 2021-11-11
Payer: MEDICAID

## 2021-11-11 VITALS
WEIGHT: 262.5 LBS | HEIGHT: 67 IN | DIASTOLIC BLOOD PRESSURE: 86 MMHG | BODY MASS INDEX: 41.2 KG/M2 | SYSTOLIC BLOOD PRESSURE: 114 MMHG

## 2021-11-11 PROCEDURE — 99213 OFFICE O/P EST LOW 20 MIN: CPT

## 2022-01-21 ENCOUNTER — APPOINTMENT (OUTPATIENT)
Dept: OBGYN | Facility: CLINIC | Age: 47
End: 2022-01-21
Payer: MEDICAID

## 2022-01-21 VITALS
SYSTOLIC BLOOD PRESSURE: 143 MMHG | BODY MASS INDEX: 40.02 KG/M2 | DIASTOLIC BLOOD PRESSURE: 88 MMHG | WEIGHT: 255 LBS | HEIGHT: 67 IN

## 2022-01-21 VITALS
DIASTOLIC BLOOD PRESSURE: 86 MMHG | SYSTOLIC BLOOD PRESSURE: 128 MMHG | HEIGHT: 67 IN | BODY MASS INDEX: 41.12 KG/M2 | WEIGHT: 262 LBS

## 2022-01-21 DIAGNOSIS — N92.6 IRREGULAR MENSTRUATION, UNSPECIFIED: ICD-10-CM

## 2022-01-21 LAB
HCG UR QL: NEGATIVE
QUALITY CONTROL: YES

## 2022-01-21 PROCEDURE — 99214 OFFICE O/P EST MOD 30 MIN: CPT

## 2022-01-21 PROCEDURE — 81025 URINE PREGNANCY TEST: CPT

## 2022-01-21 RX ORDER — METFORMIN HYDROCHLORIDE 500 MG/1
500 TABLET, COATED ORAL
Qty: 2 | Refills: 3 | Status: DISCONTINUED | COMMUNITY
Start: 2018-09-05 | End: 2022-01-21

## 2022-01-21 NOTE — HISTORY OF PRESENT ILLNESS
[N] : Patient does not use contraception [Y] : Positive pregnancy history [Regular Cycle Intervals] : periods have been regular [Frequency: Q ___ days] : menstrual periods occur approximately every [unfilled] days [Menarche Age: ____] : age at menarche was [unfilled] [PGHxTotal] : 2 [PGHxFullTerm] : 0 [PGHxPremature] : 0 [PGHxAbortions] : 2 [[facility]] : 0 [PGHxABInduced] : 1 [PGHxABSpont] : 1 [PGHxEctopic] : 0 [PGHxMultBirths] : 0

## 2022-01-22 LAB
C TRACH RRNA SPEC QL NAA+PROBE: NOT DETECTED
HBV SURFACE AG SER QL: NONREACTIVE
HCV AB SER QL: NONREACTIVE
HCV S/CO RATIO: 0.12 S/CO
HIV1+2 AB SPEC QL IA.RAPID: NONREACTIVE
HPV HIGH+LOW RISK DNA PNL CVX: NOT DETECTED
N GONORRHOEA RRNA SPEC QL NAA+PROBE: NOT DETECTED
SOURCE TP AMPLIFICATION: NORMAL

## 2022-01-24 LAB
HSV 1+2 IGG SER IA-IMP: POSITIVE
HSV 1+2 IGG SER IA-IMP: POSITIVE
HSV1 IGG SER QL: 6.9 INDEX
HSV2 IGG SER QL: 14.4 INDEX
T PALLIDUM AB SER QL IA: NEGATIVE

## 2022-01-26 LAB — CYTOLOGY CVX/VAG DOC THIN PREP: ABNORMAL

## 2022-01-28 LAB
HSV1 IGM SER QL: NEGATIVE
HSV2 AB FLD-ACNC: NEGATIVE

## 2022-03-28 NOTE — H&P PST ADULT - HISTORY OF PRESENT ILLNESS
Inpatient Physical Therapy Evaluation and Treatment    Unit: ED (Inpatient Status)  Date:  3/28/2022  Patient Name:    Malinda Blackman  Admitting diagnosis:  Hypoxia [R09.02]  Admit Date:  3/26/2022  Precautions/Restrictions/WB Status/ Lines/ Wounds/ Oxygen: Fall risk, Bed/chair alarm, Lines -IV, [place] (hard of hearing), Telemetry and Continuous pulse oximetry    Treatment Time: 0936 - 1015  Treatment Number:  1   Timed Code Treatment Minutes: 29 minutes  Total Treatment Minutes:  39  minutes    Patient Goals for Therapy: \" Go home \"          Discharge Recommendations: Home 24 hr assist and with home PT   DME needs for discharge: Needs Met       Therapy recommendation for EMS Transport: can transport by wheelchair    Therapy recommendations for staff:   Assist of 1 with use of rolling walker (RW) and gait belt for all transfers and ambulation to/from [facility]  to/from chair  to/from bathroom    History of Present Illness: H & P as per Reynold Lorenzana MD's note dated 3/26/2022  80 y.o. female demented female who was brought to the emergency department by EMS for unresponsiveness. The patient lives at home with family. I have tried to contact family without success. But per ER staff she was found down and unresponsive. The family attempted to try to wake her up without success so to call EMS. Per reports he took EMS a couple of minutes before the patient was able to be aroused. Vital signs were stable and her blood sugars were within normal limits. In emergency department she was awake and alert, apparently she was hypoxic on room air and was started on supplemental oxygen otherwise she was stable. She is demented and cannot provide much history. She will be admitted for observation. Home Health S4 Level Recommendation:  Level 3 Safety  AM-PAC Mobility Score    AM-PAC Inpatient Mobility Raw Score : 17       Preadmission Environment  5/16/2019  Pt.  Lives With Family and 24/7 Assist Available  (son & daughter-in-law)  [place] environment[place]  [place]  Steps to enter first floor: No steps  Steps to second floor: N/A  Bathroom: Bath Tub Shower, Walk in [place], Grab bars, Shower Chair  and raised toilet seat  Equipment owned: Rolling Walker, [facility], Shower Chair, transport chair, hospital bed, bed with adjustible frame without bed rails, lift chair  and [facility]     Preadmission Status:  Pt. Able to drive: No  Pt Fully independent with ADLs: No (family assists with lower body), IND with feeding, IND with toileting  Pt. Required assistance from family for: Bathing, Cleaning, Cooking, Dressing and Laundry   Pt. Fully independent for transfers and gait and walked with Dm Sen, walks around the house by herself  History of falls 1 fall slide out of the bed but did not get injured and was able to get up from the floor  Patient had not been sleeping in the nights for 2-4 days in a row and trying to get out of the house. All house doors has alarm system. Pain   Yes  Location: at anal region while trying to have bowel movement  Rating: moderate /10  Pain Medicine Status: RN notified    Cognition    A&O Person   Able to follow 1 step commands    Subjective  Patient lying supine in bed with son present. Pt agreeable to this PT eval & tx. Upper Extremity ROM/Strength  Please see OT evaluation.       Lower Extremity ROM / Strength   AROM WFL: Yes  ROM limitations: N/A    Strength Assessment (measured on a 0-5 scale):  R LE   Quad   3+   Ant Tib  3+   Hamstring 3+   Iliopsoas 3+  L LE  Quad   3+   Ant Tib  3+   Hamstring 3+   Iliopsoas 3+    Lower Extremity Sensation    WFL    Lower Extremity Proprioception:   WFL    Coordination and Tone  WFL    Balance  Sitting:  Fair +; CGA  Comments:     Standing: Fair -; CGA  Comments: ~4 min    Bed Mobility   Supine to Sit:    CGA  Sit to Supine:   Not Tested  Rolling:   Not Tested  Scooting in sitting: CGA  Scooting in supine:  Not Tested    Transfer Training     Sit to stand:   CGA  Stand to sit: CGA  Bed to Chair:   CGA with use of gait belt and rolling walker (RW)    Gait gait completed as indicated below  Distance:      5 ft + 5 ft  Deviations (firm surface/linoleum):  decreased edith, increased KATE, forward flexed posture, decreased step length bilaterally and decreased stance time bilaterally  Assistive Device Used:    gait belt and rolling walker (RW)  Level of Assist:    CGA  Comment:    Stair Training deferred, pt does not have stairs in home environment    Activity Tolerance   Pt completed therapy session with Pain noted with during bowel movement which eased up after passing stools    Positioning Needs   Pt up in chair, no alarm needed, positioned in proper neutral alignment and pressure relief provided. Call light provided and all needs within reach. Patient was in ED set up and RN notified about the fall risk. Exercises Initiated  Quinn deferred secondary to treatment focus on functional mobility  NA    Other  None. Patient/Family Education   Pt educated on role of inpatient PT, POC, importance of continued activity, DC recommendations, safety awareness, transfer techniques, pursed lip breathing, energy conservation, pacing activity and calling for assist with mobility. Assessment  Pt seen for Physical Therapy evaluation in acute care setting. Pt demonstrated decreased Activity tolerance, Balance, ROM, Safety and Strength as well as decreased independence with Ambulation, Bed Mobility  and Transfers. Recommending Home 24 hr assist and with home PT upon discharge as patient functioning below baseline level and would benefit from continued therapy services    Goals : To be met in 3 visits:  1). Independent with LE Ex x 10 reps    To be met in 6 visits:  1). Supine to/from sit: SBA  2). Sit to/from stand: SBA  3). Bed to chair: SBA  4). Gait: Ambulate 40 ft.   with  SBA and use of LRAD (least restrictive assistive device)  5).   Tolerate B LE exercises 3 sets of 10-15 reps    Rehabilitation Potential: Good  Strengths for achieving goals include:   Pt motivated and Pt cooperative   Barriers to achieving goals include:    Complexity of condition and Weakness    Plan    To be seen 3-5 x / week  while in acute care setting for therapeutic exercises, bed mobility, transfers, progressive gait training, balance training, and family/patient education. Signature: Madison De Leon MS PT, # [id number]    If patient discharges from this facility prior to next visit, this note will serve as the Discharge Summary. This is 42 y.o female who presents [facility] today.  The pt has a history of endometriosis and has been experiencing abnormal vaginal bleeding both heavier menstrual cycles and bleeding between her normal periods for the past several months.  She is now scheduled for a D & C and Hysteroscopy in the near future.

## 2022-06-01 DIAGNOSIS — N64.89 OTHER SPECIFIED DISORDERS OF BREAST: ICD-10-CM

## 2022-06-06 ENCOUNTER — APPOINTMENT (OUTPATIENT)
Dept: OBGYN | Facility: CLINIC | Age: 47
End: 2022-06-06

## 2022-06-22 ENCOUNTER — APPOINTMENT (OUTPATIENT)
Dept: OBGYN | Facility: CLINIC | Age: 47
End: 2022-06-22
Payer: MEDICAID

## 2022-06-22 VITALS
WEIGHT: 259.8 LBS | HEIGHT: 67 IN | BODY MASS INDEX: 40.78 KG/M2 | DIASTOLIC BLOOD PRESSURE: 80 MMHG | SYSTOLIC BLOOD PRESSURE: 110 MMHG

## 2022-06-22 DIAGNOSIS — D25.1 INTRAMURAL LEIOMYOMA OF UTERUS: ICD-10-CM

## 2022-06-22 DIAGNOSIS — D25.0 INTRAMURAL LEIOMYOMA OF UTERUS: ICD-10-CM

## 2022-06-22 PROCEDURE — 99213 OFFICE O/P EST LOW 20 MIN: CPT

## 2022-09-23 ENCOUNTER — APPOINTMENT (OUTPATIENT)
Dept: OBGYN | Facility: CLINIC | Age: 47
End: 2022-09-23

## 2022-09-23 VITALS
HEIGHT: 67 IN | SYSTOLIC BLOOD PRESSURE: 126 MMHG | WEIGHT: 253.19 LBS | BODY MASS INDEX: 39.74 KG/M2 | DIASTOLIC BLOOD PRESSURE: 77 MMHG

## 2022-09-23 PROCEDURE — 99214 OFFICE O/P EST MOD 30 MIN: CPT

## 2022-09-24 LAB
BASOPHILS # BLD AUTO: 0.04 K/UL
BASOPHILS NFR BLD AUTO: 0.6 %
EOSINOPHIL # BLD AUTO: 0.05 K/UL
EOSINOPHIL NFR BLD AUTO: 0.8 %
FSH SERPL-MCNC: 12 IU/L
HCT VFR BLD CALC: 40 %
HGB BLD-MCNC: 13.2 G/DL
IMM GRANULOCYTES NFR BLD AUTO: 0.3 %
LH SERPL-ACNC: 6 IU/L
LYMPHOCYTES # BLD AUTO: 1.34 K/UL
LYMPHOCYTES NFR BLD AUTO: 20.1 %
MAN DIFF?: NORMAL
MCHC RBC-ENTMCNC: 30.2 PG
MCHC RBC-ENTMCNC: 33 GM/DL
MCV RBC AUTO: 91.5 FL
MONOCYTES # BLD AUTO: 0.61 K/UL
MONOCYTES NFR BLD AUTO: 9.2 %
NEUTROPHILS # BLD AUTO: 4.6 K/UL
NEUTROPHILS NFR BLD AUTO: 69 %
PLATELET # BLD AUTO: 296 K/UL
RBC # BLD: 4.37 M/UL
RBC # FLD: 13.4 %
TSH SERPL-ACNC: 3.33 UIU/ML
WBC # FLD AUTO: 6.66 K/UL

## 2022-10-04 ENCOUNTER — APPOINTMENT (OUTPATIENT)
Dept: OBGYN | Facility: CLINIC | Age: 47
End: 2022-10-04

## 2022-10-04 PROCEDURE — 99214 OFFICE O/P EST MOD 30 MIN: CPT | Mod: 95

## 2022-10-04 NOTE — REASON FOR VISIT
[Home] : at home, [unfilled] , at the time of the visit. [Medical Office: ([facility])___] : at the medical office located in  [Patient] : the patient

## 2022-11-22 ENCOUNTER — APPOINTMENT (OUTPATIENT)
Dept: OBGYN | Facility: CLINIC | Age: 47
End: 2022-11-22

## 2022-11-22 VITALS
DIASTOLIC BLOOD PRESSURE: 76 MMHG | WEIGHT: 258 LBS | SYSTOLIC BLOOD PRESSURE: 120 MMHG | HEIGHT: 67 IN | BODY MASS INDEX: 40.49 KG/M2

## 2022-11-22 DIAGNOSIS — N92.0 EXCESSIVE AND FREQUENT MENSTRUATION WITH REGULAR CYCLE: ICD-10-CM

## 2022-11-22 PROCEDURE — 99214 OFFICE O/P EST MOD 30 MIN: CPT

## 2022-12-19 NOTE — ASU PATIENT PROFILE, ADULT - AS SC BRADEN NUTRITION
Patient: Silvestre Crabtree Date: 2022   : 1947    75 year old male      INPATIENT WOUND CARE PROGRESS NOTE     Supervising Wound Care / Hyperbaric Medicine Physician: Not Applicable   Consulting Provider:  Candie Ocampo MD  Date of Consultation/Last Comprehensive Exam:  12/15/22  Referring  Provider:  Se Maurice MD     SUBJECTIVE:    Chief Complaint:  Skin tear scrotum    Wound/Ulcer Present:    Venous leg ulcer:  Current Vascular Assessment:  Physical exam.  Current Venous Type:  Venous insufficiency ulcer, lower extremity LLE    Has the patient received adequate compression therapy for equal to or greater than 4 weeks?  Yes:  Size E Tetra ,     Additional Wound Category:  Other, moisture related skin damage      Maximum Baseline Ambulatory Status:  Ambulates with assistance    History of Present Illness:  This is a 75 year old the patient with past medical history of hypertension, hyperlipidemia, sleep apnea, lymphedema, obesity and lower leg ulcers. Also known to wound care for moisture related breakdown to the pannus folds.  Last seen by our wound care team in the wound clinic on 2021 for BLE venous ulcers.  Maintained with size F tetragrips.   Patient is admitted for R groin and scrotal pain x 3 days, progressively worsening.  No fevers.  Pain improved with sitting in cold water.  Imaging (CT/US in ER) consistent with  scrotal cellulitis.  He was admitted for further management.  While sitting in chair yesterday, his scrotum \"got caught\" with repositioning himself and he developed a skin tear of the posterior scrotum.  This has been oozing quite a bit.  Urology was consulting and there is no indication for surgical closure.  Topical dressings applied.  Wound care is consulted for evaluation and management of scrotal skin tear.    Interval History   No further bleeding since sutures were placed. The pt reports severe pain in the area that is managed with pain medication.  He is  sitting in a wheelchair with a coccyx offloading cushion turned 180 degrees to offload the scrotum.  Pt denies N/V/fevers/chills, SOB, chest pain, myalgias.     Current Treatment Regimen:  Dressing:  xeroform, absorbent dressings     Frequency:  Daily   Changed by:  Staff/bedside nurse     Review of Systems:   Pertinent items are noted in HPI (history of present illness).    Past Medical History:   Diagnosis Date   • Calculus of kidney 11/1997, 2012    ON ALLOPURINOL FOR THIS   • Cellulitis    • Edema     Lower Legs   • Elevated C-reactive protein (CRP) 2/2005    Is hs-CRP   • Enlargement of lymph nodes 5/2004    Retroperitoneal OF UNKNOWN ETIOLOGY   • FRACTURE CLOSED FIBULA-SHAFT-ONLY 07/07/2008    Surgical repair   • INSUFFICIENCY VENOUS 05/01/2008    Chronic bilateral lower extremity venous ulcerations.   • LYMPHEDEMA 05/01/2008   • Methicillin resistant Staphylococcus aureus in conditions classified elsewhere and of unspecified site 12/1/2009    LOWER LEG ULCERS   • Mobility impaired     uses walker or W/C for distance   • Obstructive sleep apnea 12/27/2003    CPAP   • PAST MEDICAL HISTORY     Hospitalized for Strep and Staph (sometime after abd surg in 2002)   • Prediabetes 6/2004   • ROTATOR CUFF TEAR 07/07/2008    Left shoulder, complete tear with retraction of the tendon.   • Urinary incontinence    • Wears glasses     readers     Past Surgical History:   Procedure Laterality Date   • Colon surgery  2002    bowel obstruction   • Exc excess skin abd inframbical panniculec  10/2014    panniculectomy   • Fracture surgery  1990's    Fibula right   • Hernia repair  10/2002    umbilical hernia, SBO, adhesions done with laparotomy   • Hernia repair  10/2014    Incarcerated hernia s/p ex lap, removal of infected mesh, radical abdominal wall and peritoneal debridement   • Tonsillectomy and adenoidectomy       Social History     Socioeconomic History   • Marital status: /Civil Union     Spouse name: Freda  Bro   • Number of children: 2   • Years of education: 16   • Highest education level: Not on file   Occupational History   • Occupation: district , taught [place]     Employer: OTHER     Comment: [employer] [id number]   • 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     Employer: OTHER     Comment: [employer]ED WITH HIS COUSIN FROM [id number]   • Occupation:      Employer: OTHER     Comment: FROM [id number] SUPPLIED FOOD FOR ALL THE FESTIVALS   Tobacco Use   • Smoking status: Never   • Smokeless tobacco: Never   Vaping Use   • Vaping Use: never used   Substance and Sexual Activity   • Alcohol use: No   • Drug use: No   • Sexual activity: Not Currently     Partners: Female   Other Topics Concern   •  Service No   • Blood Transfusions No   • Caffeine Concern No   • Occupational Exposure No   • Hobby Hazards No   • Sleep Concern Yes   • Stress Concern Yes     Comment: Sees Cinda Carmona for psych help.   • Weight Concern Yes   • Special Diet Yes   • Back Care No   • Exercise Yes   • Bike Helmet No   • Seat Belt No     Comment: Too small to wear   • Self-Exams No   Social History Narrative     in 1993 to Juanita and together since 1989; has their own house since 1994; interests include watching TV and reading.       Social Determinants of Health     Financial Resource Strain: Low Risk    • Social Determinants: Financial Resource Strain: None   Food Insecurity: No Food Insecurity   • Social Determinants: Food Insecurity: Never   Transportation Needs: Not on file   Physical Activity: Inactive   • Days of Exercise per Week: 0 days   • Minutes of Exercise per Session: 0 min   Stress: Not on file   Social Connections: Socially Integrated   • Social Determinants: Social Connections: 5 or more times a week   Intimate Partner Violence: Not At Risk   • Social Determinants: Intimate Partner Violence Past Fear: No   • Social Determinants: Intimate Partner Violence Current Fear: No     Family  History   Problem Relation Age of Onset   • Asthma Mother    • Hypertension Mother    • COPD Mother         pneumonia   • Cancer Father         Leukemia   • Blood Disorder Father         Leukemia   • Musculoskeletal Sister         back problems   • Multiple myeloma Sister 69   • Immunodeficiency Brother    • Mental retardation Maternal Grandfather    • Neurofibromatosis Daughter    • Diabetes Other         Aunt     Current Facility-Administered Medications   Medication   • cephalexin (KEFLEX) capsule 500 mg   • HYDROmorphone (DILAUDID) injection 0.5 mg   • oxyCODONE-acetaminophen (PERCOCET) 5-325 MG tablet 1 tablet   • sodium chloride (PF) 0.9 % injection 10 mL   • sodium chloride (PF) 0.9 % injection 10 mL   • dextrose 50 % injection 25 g   • dextrose 50 % injection 12.5 g   • glucagon (GLUCAGEN) injection 1 mg   • dextrose (GLUTOSE) 40 % gel 15 g   • dextrose (GLUTOSE) 40 % gel 30 g   • sodium chloride 0.9 % flush bag 25 mL   • insulin lispro (ADMELOG,HumaLOG) - Correction Dose   • naLOXone (NARCAN) injection 0.4 mg   • [Held by provider] enoxaparin (LOVENOX) injection 100 mg   • sodium chloride 0.9 % flush bag 25 mL   • polyethylene glycol (MIRALAX) packet 17 g   • allopurinol (ZYLOPRIM) tablet 300 mg   • amLODIPine (NORVASC) tablet 5 mg   • ascorbic acid (VITAMIN C) tablet 500 mg   • atorvastatin (LIPITOR) tablet 10 mg   • bumetanide (BUMEX) tablet 2 mg   • cholecalciferol (VITAMIN D) tablet 1,000 Units   • docusate sodium-sennosides (SENOKOT S) 50-8.6 MG 1 tablet   • finasteride (PROSCAR) tablet 5 mg   • losartan (COZAAR) tablet 100 mg   • nystatin (MYCOSTATIN) powder 1 application   • oxybutynin (DITROPAN-XL) ER tablet 15 mg   • Magnesium Standard Replacement Protocol   • Potassium Standard Replacement Protocol (Levels 3.5 and lower)   • Potassium Replacement (Levels 3.6 - 4)   • ondansetron (ZOFRAN) injection 4 mg   • acetaminophen (TYLENOL) tablet 650 mg   • bisacodyl (DULCOLAX) suppository 10 mg   •  aluminum-magnesium hydroxide-simethicone (MAALOX) 200-200-20 MG/5ML suspension 30 mL      ALLERGIES:  Morphine [morphine sulfate-nacl]    OBJECTIVE:  Vital Signs:    Visit Vitals  BP (!) 142/84 (BP Location: LFA - Left forearm, Patient Position: Semi-Reynoso's)   Pulse 69   Temp 98 °F (36.7 °C) (Oral)   Resp 16   Ht 5' 6\" (1.676 m)   Wt (!) 202.4 kg (446 lb 3.4 oz)   SpO2 92%   BMI 72.02 kg/m²       Estimated body mass index is 72.02 kg/m² as calculated from the following:    Height as of this encounter: 5' 6\" (1.676 m).    Weight as of this encounter: 202.4 kg (446 lb 3.4 oz).     Physical Exam:    General appearance: Appears stated age, Alert, obese, oriented to person, place, time and situation, in no distress and cooperative  Head:   normocephalic without obvious abnormality  Pulmonary: normal respiratory effort  Abdomen: nondistended and large abdominal pannus  Skin: positive findings: venous stasis, hyperpigmentation bilateral lower extremities  Ulcer and Wound: Scrotal skin tear   Scrotal edema (chronic)  Diaz catheter (chronic)  Sizeable full thickness skin tear of the scrotum with scant serous drainage, no active bleeding.  Exquisitely tender to touch.  No odor.  Margins appear to be attached.  There is skin necrosis at the 3-5 margin.  Yellow slough in the base of the wound with loss of granulation tissue since last week.  No odor.          Wound Bed Quality: As above   Luz-wound Quality:    As above   Additional Descriptors:  drainage     Wound Measurements Per Flowsheet:     Wound Abdomen Anterior Moisture Associated Skin Damage (MASD) (Active)   Number of days: 7       Wound Scrotum Laceration (Active)   Wound Care Team Consult Date 12/15/22 12/15/22 0939   Number of days: 4     PROCEDURE:    None performed    Procedure was Performed by:  N/A    Laboratory assessments reviewed:  PREALBUMIN (mg/dL)   Date Value   11/10/2017 13.1 (L)   11/12/2014 18.0   11/01/2014 13.7 (L)      Albumin (g/dL)   Date  Value   12/15/2022 2.6 (L)   12/11/2022 3.0 (L)   10/21/2022 2.8 (L)      Recent Labs   Lab 12/18/22  1657 12/18/22  2036 12/19/22  0615   GLUCOSE BEDSIDE 112* 216* 135*       Lab Results   Component Value Date    WBC 8.5 12/18/2022    GLUCOSE 144 (H) 12/18/2022    HGBA1C 5.9 (H) 10/12/2022    CRP 7.7 (H) 10/29/2014    RESR 39 (H) 03/25/2016    CREATININE 1.10 12/18/2022    GFRA >90 04/01/2019    GFRNA 83 04/01/2019        Culture results:  NA    Diagnostic Assessments Reviewed:   No new update   Nutritional Assessment:   Prealbumin and/or Albumin reviewed    Wound treatment goals are palliative:  No    DIAGNOSES:  Venous insufficiency, chronic BLE, controlled with size F tetragrips   Obesity  Scrotal skin tear noted 12/15/22   Morbid obesity    Medical Decision Making:   Consulted for scrotal skin tear. This occurred while patient was sitting in the recliner and he was trying to reposition. S/p suturing by ICU and urology on 12/15/22 for bleeding. No bleeding noted today. Xeroform is falling off.  I will try gentamicin ointment, adaptic, ABD, and surgical mesh underwear to hold dressings in place.       Local Wound Care  Scrotum wound  Gentamicin, adaptic, ABD, secure with underwear/surgical mesh pants  Change daily and prn.    Edema control:  - At this time he does not have any open wounds to his BLE's.    - Continue maintenance size F tetragrips (pt's own)    Offloading  - Offloading was reviewed and discussed with patient today.  - Elevate legs and scrotum    Infectious Disease  - No clinical signs of infection on exam    Nutrition  - Encouraged to continue healthy diet, continue to consume protein shakes     Imaging & Vascular  - No indication for imaging at this time.  - 9/15/2020 and 2/15/21 - BLE Venous insufficiency US ordered, pt has not scheduled  - R arterial duplex ordered 3/15/21, patent 3 vessel runoff.    Follow up:   Wound team will follow.  Wife can perform dressing changes on discharge.     Plan  of Care:  Advanced Wound Care Recommendations:  As noted above   Percent Wound Closure from consult:  Not applicable  Care plan to augment wound closure:Not applicable.  Consult 12/15/22     Significant note data copied forward from my prior note and reviewed by me as needed in the formulation of this note and plan.    Candie Ocampo MD  Hyperbaric Medicine and Wound Care  [phone] pager         (4) excellent

## 2022-12-27 RX ORDER — NORETHINDRONE AND ETHINYL ESTRADIOL 1 MG-35MCG
1-35 KIT ORAL DAILY
Qty: 3 | Refills: 2 | Status: COMPLETED | COMMUNITY
Start: 2018-06-25 | End: 2022-12-27

## 2022-12-27 RX ORDER — TRANEXAMIC ACID 650 MG/1
650 TABLET ORAL 3 TIMES DAILY
Qty: 18 | Refills: 6 | Status: COMPLETED | COMMUNITY
Start: 2021-08-19 | End: 2022-12-27

## 2022-12-27 RX ORDER — OXYCODONE AND ACETAMINOPHEN 5; 325 MG/1; MG/1
5-325 TABLET ORAL
Qty: 20 | Refills: 0 | Status: COMPLETED | COMMUNITY
Start: 2018-08-30 | End: 2022-12-27

## 2022-12-27 RX ORDER — MEDROXYPROGESTERONE ACETATE 10 MG/1
10 TABLET ORAL
Qty: 60 | Refills: 1 | Status: COMPLETED | COMMUNITY
Start: 2018-06-08 | End: 2022-12-27

## 2022-12-27 RX ORDER — METFORMIN HYDROCHLORIDE 500 MG/1
500 TABLET, COATED ORAL
Qty: 2 | Refills: 3 | Status: COMPLETED | COMMUNITY
Start: 2018-09-05 | End: 2022-12-27

## 2023-01-03 ENCOUNTER — APPOINTMENT (OUTPATIENT)
Dept: OBGYN | Facility: CLINIC | Age: 48
End: 2023-01-03
Payer: MEDICAID

## 2023-01-03 DIAGNOSIS — N94.6 DYSMENORRHEA, UNSPECIFIED: ICD-10-CM

## 2023-01-03 DIAGNOSIS — N94.10 UNSPECIFIED DYSPAREUNIA: ICD-10-CM

## 2023-01-03 DIAGNOSIS — N92.1 EXCESSIVE AND FREQUENT MENSTRUATION WITH IRREGULAR CYCLE: ICD-10-CM

## 2023-01-03 PROCEDURE — 99442: CPT | Mod: 95

## 2023-01-03 NOTE — REASON FOR VISIT
[Home] : at home, [unfilled] , at the time of the visit. [Medical Office: ([facility])___] : at the medical office located in  [Patient] : the patient

## 2023-01-23 RX ORDER — NORETHINDRONE ACETATE AND ETHINYL ESTRADIOL AND FERROUS FUMARATE 1.5-30(21)
1.5-3 KIT ORAL
Qty: 4 | Refills: 1 | Status: COMPLETED | COMMUNITY
Start: 2022-09-23 | End: 2023-01-23

## 2023-01-31 ENCOUNTER — RX RENEWAL (OUTPATIENT)
Age: 48
End: 2023-01-31

## 2023-03-31 ENCOUNTER — RX RENEWAL (OUTPATIENT)
Age: 48
End: 2023-03-31

## 2023-09-06 NOTE — PATIENT PROFILE ADULT. - FALL HARM RISK CONCLUSION
Universal Safety Interventions PAST MEDICAL HISTORY:  Anxiousness concern re surgery    Arm fracture, right 2010  s/p fall    Benign hypertension     Bipolar disorder oral medication    CAD (coronary artery disease)     Cancer of skin multiple locations several have been removed and or laser    face (forehead), nose, arms    Cloudy urine     Conjunctivitis, bacterial     COPD (chronic obstructive pulmonary disease)     Gait disturbance     H/O gastroesophageal reflux (GERD)     Kidney lesion     Myocardial infarct, old 2016    Unilateral primary osteoarthritis, left hip     Weight loss attempting to loss weight states so far has lost about 10 pounds

## 2023-09-18 ENCOUNTER — RX RENEWAL (OUTPATIENT)
Age: 48
End: 2023-09-18

## 2023-10-30 ENCOUNTER — RX RENEWAL (OUTPATIENT)
Age: 48
End: 2023-10-30

## 2023-10-30 ENCOUNTER — APPOINTMENT (OUTPATIENT)
Dept: OBGYN | Facility: CLINIC | Age: 48
End: 2023-10-30
Payer: MEDICAID

## 2023-10-30 VITALS — HEIGHT: 67 IN | DIASTOLIC BLOOD PRESSURE: 84 MMHG | SYSTOLIC BLOOD PRESSURE: 153 MMHG

## 2023-10-30 VITALS — DIASTOLIC BLOOD PRESSURE: 82 MMHG | SYSTOLIC BLOOD PRESSURE: 133 MMHG

## 2023-10-30 DIAGNOSIS — Z11.3 ENCOUNTER FOR SCREENING FOR INFECTIONS WITH A PREDOMINANTLY SEXUAL MODE OF TRANSMISSION: ICD-10-CM

## 2023-10-30 DIAGNOSIS — Z12.39 ENCOUNTER FOR OTHER SCREENING FOR MALIGNANT NEOPLASM OF BREAST: ICD-10-CM

## 2023-10-30 DIAGNOSIS — N92.0 EXCESSIVE AND FREQUENT MENSTRUATION WITH REGULAR CYCLE: ICD-10-CM

## 2023-10-30 DIAGNOSIS — Z01.411 ENCOUNTER FOR GYNECOLOGICAL EXAMINATION (GENERAL) (ROUTINE) WITH ABNORMAL FINDINGS: ICD-10-CM

## 2023-10-30 DIAGNOSIS — F41.9 ANXIETY DISORDER, UNSPECIFIED: ICD-10-CM

## 2023-10-30 DIAGNOSIS — F32.A ANXIETY DISORDER, UNSPECIFIED: ICD-10-CM

## 2023-10-30 PROCEDURE — 99396 PREV VISIT EST AGE 40-64: CPT

## 2023-10-30 PROCEDURE — 99213 OFFICE O/P EST LOW 20 MIN: CPT | Mod: 25

## 2023-10-30 RX ORDER — NORETHINDRONE ACETATE AND ETHINYL ESTRADIOL 1.5; 3 MG/1; UG/1
1.5-3 TABLET ORAL
Qty: 4 | Refills: 0 | Status: DISCONTINUED | COMMUNITY
Start: 2023-01-23 | End: 2023-10-30

## 2023-10-30 RX ORDER — MEDROXYPROGESTERONE ACETATE 10 MG/1
10 TABLET ORAL DAILY
Qty: 5 | Refills: 3 | Status: DISCONTINUED | COMMUNITY
Start: 2023-01-20 | End: 2023-10-30

## 2023-10-30 RX ORDER — ESCITALOPRAM OXALATE 10 MG/1
10 TABLET ORAL DAILY
Qty: 30 | Refills: 2 | Status: ACTIVE | COMMUNITY
Start: 2023-10-30 | End: 1900-01-01

## 2023-10-30 RX ORDER — IBUPROFEN 800 MG/1
800 TABLET, FILM COATED ORAL
Qty: 60 | Refills: 2 | Status: DISCONTINUED | COMMUNITY
Start: 2022-11-22 | End: 2023-10-30

## 2023-10-30 RX ORDER — NORETHINDRONE ACETATE AND ETHINYL ESTRADIOL AND FERROUS FUMARATE 1.5-30(21)
1.5-3 KIT ORAL DAILY
Qty: 3 | Refills: 1 | Status: ACTIVE | COMMUNITY
Start: 2021-10-20 | End: 1900-01-01

## 2023-10-30 RX ORDER — PRENATAL VIT NO.130/IRON/FOLIC 27MG-0.8MG
28-0.8 TABLET ORAL DAILY
Qty: 90 | Refills: 3 | Status: DISCONTINUED | COMMUNITY
Start: 2022-09-23 | End: 2023-10-30

## 2023-10-31 ENCOUNTER — TRANSCRIPTION ENCOUNTER (OUTPATIENT)
Age: 48
End: 2023-10-31

## 2023-10-31 LAB
BASOPHILS # BLD AUTO: 0.03 K/UL
BASOPHILS NFR BLD AUTO: 0.5 %
C TRACH RRNA SPEC QL NAA+PROBE: NOT DETECTED
EOSINOPHIL # BLD AUTO: 0.06 K/UL
EOSINOPHIL NFR BLD AUTO: 1 %
HBV SURFACE AG SER QL: NONREACTIVE
HCT VFR BLD CALC: 43.4 %
HCV AB SER QL: NONREACTIVE
HCV S/CO RATIO: 0.18 S/CO
HGB BLD-MCNC: 14.4 G/DL
HIV1+2 AB SPEC QL IA.RAPID: NONREACTIVE
HPV HIGH+LOW RISK DNA PNL CVX: NOT DETECTED
IMM GRANULOCYTES NFR BLD AUTO: 0.2 %
LYMPHOCYTES # BLD AUTO: 1.2 K/UL
LYMPHOCYTES NFR BLD AUTO: 19.7 %
MAN DIFF?: NORMAL
MCHC RBC-ENTMCNC: 30.3 PG
MCHC RBC-ENTMCNC: 33.2 GM/DL
MCV RBC AUTO: 91.2 FL
MONOCYTES # BLD AUTO: 0.51 K/UL
MONOCYTES NFR BLD AUTO: 8.4 %
N GONORRHOEA RRNA SPEC QL NAA+PROBE: NOT DETECTED
NEUTROPHILS # BLD AUTO: 4.27 K/UL
NEUTROPHILS NFR BLD AUTO: 70.2 %
PLATELET # BLD AUTO: 238 K/UL
RBC # BLD: 4.76 M/UL
RBC # FLD: 13 %
SOURCE TP AMPLIFICATION: NORMAL
WBC # FLD AUTO: 6.08 K/UL

## 2023-11-01 LAB — T PALLIDUM AB SER QL IA: NEGATIVE

## 2023-11-03 LAB — CYTOLOGY CVX/VAG DOC THIN PREP: ABNORMAL

## 2023-11-09 DIAGNOSIS — B96.89 ACUTE VAGINITIS: ICD-10-CM

## 2023-11-09 DIAGNOSIS — N76.0 ACUTE VAGINITIS: ICD-10-CM

## 2023-11-09 RX ORDER — FLUCONAZOLE 150 MG/1
150 TABLET ORAL
Qty: 2 | Refills: 1 | Status: ACTIVE | COMMUNITY
Start: 2023-11-09 | End: 1900-01-01

## 2023-11-09 RX ORDER — METRONIDAZOLE 7.5 MG/G
0.75 GEL VAGINAL
Qty: 1 | Refills: 0 | Status: ACTIVE | COMMUNITY
Start: 2023-11-09 | End: 1900-01-01

## 2023-11-10 ENCOUNTER — TRANSCRIPTION ENCOUNTER (OUTPATIENT)
Age: 48
End: 2023-11-10

## 2024-02-05 RX ORDER — METFORMIN HYDROCHLORIDE 500 MG/1
500 TABLET, COATED ORAL
Qty: 180 | Refills: 3 | Status: ACTIVE | COMMUNITY
Start: 2022-09-23 | End: 1900-01-01

## 2024-08-19 NOTE — CONSULT NOTE ADULT - PROVIDER SPECIALTY LIST ADULT
1248  To PACU from OR via gurney, sleeping, respirations spontaneous and non-labored via OPA. Icepack applied over c/d/i left knee surgical dressings-reinforced with tegaderm until fernie flashing green    1254 pt waking, opa dc'd     1305 report to chetan salgado   GYN

## 2024-11-17 NOTE — ED ADULT NURSE NOTE - CHIEF COMPLAINT
The patient is a 46y Female complaining of vaginal bleeding.